# Patient Record
Sex: FEMALE | Race: WHITE
[De-identification: names, ages, dates, MRNs, and addresses within clinical notes are randomized per-mention and may not be internally consistent; named-entity substitution may affect disease eponyms.]

---

## 2019-01-19 ENCOUNTER — HOSPITAL ENCOUNTER (INPATIENT)
Dept: HOSPITAL 74 - YASAS | Age: 54
LOS: 4 days | Discharge: HOME | End: 2019-01-23
Attending: NEUROMUSCULOSKELETAL MEDICINE & OMM | Admitting: NEUROMUSCULOSKELETAL MEDICINE & OMM
Payer: COMMERCIAL

## 2019-01-19 VITALS — BODY MASS INDEX: 27.6 KG/M2

## 2019-01-19 DIAGNOSIS — E03.9: ICD-10-CM

## 2019-01-19 DIAGNOSIS — J45.22: ICD-10-CM

## 2019-01-19 DIAGNOSIS — M54.5: ICD-10-CM

## 2019-01-19 DIAGNOSIS — E66.9: ICD-10-CM

## 2019-01-19 DIAGNOSIS — Z86.010: ICD-10-CM

## 2019-01-19 DIAGNOSIS — R73.03: ICD-10-CM

## 2019-01-19 DIAGNOSIS — G89.29: ICD-10-CM

## 2019-01-19 DIAGNOSIS — F10.230: Primary | ICD-10-CM

## 2019-01-19 PROCEDURE — HZ2ZZZZ DETOXIFICATION SERVICES FOR SUBSTANCE ABUSE TREATMENT: ICD-10-PCS | Performed by: NEUROMUSCULOSKELETAL MEDICINE & OMM

## 2019-01-19 RX ADMIN — Medication SCH MG: at 22:24

## 2019-01-19 NOTE — HP
CIWA Score


Nausea/Vomitin


Muscle Tremors: 4-Moderate,w/Arms Extend


Anxiety: 2


Agitation: 1-Slight > Activity


Paroxysmal Sweats: 2


Orientation: 0-Oriented


Tacttile Disturbances: 0-None


Auditory Disturbances: 0-None


Visual Disturbances: 0-None


Headache: 2-Mild


CIWA-Ar Total Score: 13





- Admission Criteria


[facility] Guidelines: Admission for Medically Managed Detox: 


Requires at least one of the followin. CIWA greater than 12


2. Seizures within the past 24 hours


3. Delirium tremens within the past 24 hours


4. Hallucinations within the past 24 hours


5. Acute intervention needed for co  occurring medical disorder


6. Acute intervention needed for co  occurring psychiatric disorder


7. Severe withdrawal that cannot be handled at a lower level of care (continued


    vomiting, continued diarrhea, abnormal vital signs) requiring intravenous


    medication and/or fluids


8. Pregnancy





Patient presents the following: CIWA greater than 12


Admission Criteria Met: Admission criteria met





Admission ROS [facility]





- [facility]


Chief Complaint: 





alcohol detox: "Im an alcoholic and don't want to die"





54 yo with hypothyroidism, pre-diabetic, colon polyps, asthma. stopped for 

about 3 years after detox in . Restarted drinking 2018





Alcohol- drinks vodka- 1 pint-1 1/2 pints/day, no h/o seizures.  





DUR_ no controlled substances


Utox- neg, LILA- 0.429





Allergies/Adverse Reactions: 


 Allergies











Allergy/AdvReac Type Severity Reaction Status Date / Time


 


No Known Allergies Allergy   Verified 19 18:47











Exam Limitations: No Limitations





- Ebola screening


Have you traveled outside of the country in the last 21 days: No (N)


Have you had contact with anyone from an Ebola affected area: No


Have you been sick,other than usual withdrawal symptoms: No


Do you have a fever: No





- Review of Systems


Constitutional: No Symptoms Reported


EENT: reports: No Symptoms Reported


Respiratory: reports: No Symptoms reported


Cardiac: reports: No Symptoms Reported


GI: reports: No Symptoms Reported


: reports: No Symptoms Reported


Musculoskeletal: reports: No Symptoms Reported


Integumentary: reports: No Symptoms Reported


Neuro: reports: No Symptoms reported


Endocrine: reports: No Symptoms Reported


Hematology: reports: No Symptoms Reported


Psychiatric: reports: No Sypmtoms Reported


Other Systems: Reviewed and Negative





Patient History





- Patient Medical History


Hx Anemia: No


Hx Asthma: Yes (DX 2000)


Hx Chronic Obstructive Pulmonary Disease (COPD): No


Hx Cancer: No


Hx Cardiac Disorders: No


Hx Congestive Heart Failure: No


Hx Hypertension: No


Hx Hypercholesterolemia: No


Hx Pacemaker: No


HX Cerebrovascular Accident: No


Hx Seizures: No


Hx Dementia: No


Hx Diabetes: Yes (pre diabetes)


Hx Gastrointestinal Disorders: No


Hx Liver Disease: No


Hx Genitourinary Disorders: No


Hx Sexually Transmitted Disorders: No


Hx Renal Disease (ESRD): No


Hx Thyroid Disease: Yes (hypothyroidism )


Hx Human Immunodeficiency Virus (HIV): No


Hx Hepatitis C: No


Hx Depression: Yes


Hx Suicide Attempt: No


Hx Bipolar Disorder: No


Hx Schizophrenia: No


Other Medical History: hypothyroidism





- Patient Surgical History


Hx Neurologic Surgery: No


Hx Cataract Extraction: No


Hx Cardiac Surgery: No


Hx Lung Surgery: No


Hx Breast Surgery: No


Hx Breast Biopsy: No


Hx Abdominal Surgery: No


Hx Appendectomy: No


Hx Cholecystectomy: No


Hx Genitourinary Surgery: No


Hx  Section: Yes (1X 09/2007)


Hx Orthopedic Surgery: No


Anesthesia Reaction: No





- PPD History


Documented Results: Negative w/o proof


Date: 11/08/15


PPD to be Administered?: Yes





- Reproductive History


Patient is a Female of Child Bearing Age (11 -55 yrs old): Yes


Patient Pregnant: No





- Smoking Cessation


Smoking history: Former smoker


Have you smoked in the past 12 months: No


Aproximately how many cigarettes per day: 0


If you are a former smoker, when did you quit?: 


Cigars Per Day: 0


Hx Chewing Tobacco Use: No


Initiated information on smoking cessation: No





- Substances Abused


  ** Alcohol


Route: Oral


Frequency: Daily


Amount used: liquor- 1 pint


Age of first use: 8


Date of Last Use: 17





Family Disease History





- Family Disease History


Family Disease History: Heart Disease: Father (htn, alcoholic), Mother (htn), 

Respiratory: Brother (asthma , alcoholics)





Admission Physical Exam [facility]





- Vital Signs


Vital Signs: 


 Vital Signs - 24 hr











  19





  15:33


 


Temperature 97.1 F L


 


Pulse Rate 92 H


 


Respiratory 16





Rate 


 


Blood Pressure 122/87














- Physical


General Appearance: Yes: Disheveled, Intoxicated


HEENTM: Yes: Within Normal Limits, Normal ENT Inspection (hard of hearing- for 

the last 2 years- has hearing aids- but did not buy b/c of high copay), Pharynx 

Normal


Respiratory: Yes: Within Normal Limits, Lungs Clear, Other (pt states L side 

pain 10th-12th rib area- says had a fall about 3 days ago- did not seek medical 

care)


Neck: Yes: Within Normal Limits


Cardiology: Yes: Within Normal Limits, Regular Rhythm


Abdominal: Yes: Within Normal Limits


Back: Yes: Within Normal Limits, Normal Inspection


Musculoskeletal: Yes: Other (pt states gait unsteady because of peripheral 

neuropathy)


Extremities: Yes: Within Normal Limits


Neurological: Yes: Within Normal Limits (pt states she has peripheral neuropathy

-so unsteady gait)


Integumentary: Yes: Within Normal Limits


Lymphatic: Yes: Within Normal Limits





BHS Breath Alcohol Content


Breath Alcohol Content: 0.429





Urine Pregancy Test





- Result


Urine Pregnancy Test Results: Negative- NO Line Present





Urine Drug Screen





- Results


Drug Screen Negative: Yes

## 2019-01-20 LAB
ALBUMIN SERPL-MCNC: 3.9 G/DL (ref 3.4–5)
ALP SERPL-CCNC: 68 U/L (ref 45–117)
ALT SERPL-CCNC: 43 U/L (ref 13–61)
ANION GAP SERPL CALC-SCNC: 9 MMOL/L (ref 8–16)
APPEARANCE UR: (no result)
AST SERPL-CCNC: 40 U/L (ref 15–37)
BILIRUB SERPL-MCNC: 0.8 MG/DL (ref 0.2–1)
BILIRUB UR STRIP.AUTO-MCNC: NEGATIVE MG/DL
BUN SERPL-MCNC: 16 MG/DL (ref 7–18)
CALCIUM SERPL-MCNC: 8.9 MG/DL (ref 8.5–10.1)
CAOX CRY URNS QL MICRO: (no result) /HPF
CHLORIDE SERPL-SCNC: 102 MMOL/L (ref 98–107)
CO2 SERPL-SCNC: 31 MMOL/L (ref 21–32)
COLOR UR: YELLOW
CREAT SERPL-MCNC: 0.9 MG/DL (ref 0.55–1.3)
GLUCOSE SERPL-MCNC: 138 MG/DL (ref 74–106)
KETONES UR QL STRIP: NEGATIVE
LEUKOCYTE ESTERASE UR QL STRIP.AUTO: (no result)
MUCOUS THREADS URNS QL MICRO: (no result)
NITRITE UR QL STRIP: NEGATIVE
PH UR: 7 [PH] (ref 5–8)
POTASSIUM SERPLBLD-SCNC: 3.5 MMOL/L (ref 3.5–5.1)
PROT SERPL-MCNC: 7.2 G/DL (ref 6.4–8.2)
PROT UR QL STRIP: NEGATIVE
PROT UR QL STRIP: NEGATIVE
SODIUM SERPL-SCNC: 143 MMOL/L (ref 136–145)
SP GR UR: 1.02 (ref 1.01–1.03)
UROBILINOGEN UR STRIP-MCNC: (no result) MG/DL (ref 0.2–1)

## 2019-01-20 RX ADMIN — Medication SCH MG: at 22:03

## 2019-01-20 RX ADMIN — Medication PRN MG: at 22:04

## 2019-01-20 RX ADMIN — SERTRALINE HYDROCHLORIDE SCH MG: 50 TABLET ORAL at 10:29

## 2019-01-20 RX ADMIN — IBUPROFEN PRN MG: 400 TABLET, FILM COATED ORAL at 17:24

## 2019-01-20 RX ADMIN — LEVOTHYROXINE SODIUM SCH MCG: 100 TABLET ORAL at 07:03

## 2019-01-20 RX ADMIN — IBUPROFEN PRN MG: 400 TABLET, FILM COATED ORAL at 10:31

## 2019-01-20 RX ADMIN — Medication SCH TAB: at 10:29

## 2019-01-20 NOTE — CONSULT
[facility] Psychiatric Consult





- Data


Date of interview: 01/20/19


Admission source: [facility]


Identifying data: 52 y/o AA female , domiciled unemployed mother of 3 , 

depenedent on 


Substance Abuse History: Admitted to our unit for ETOH abuse, she denies balck 

out spelss or seizure disorder.  She has pior Detox admission to [facility].  

She explained that she has been sober until she relapsed last year drinking 

alcohol  she drinks Vodka daily.  Refer to drug counselor summary for more 

detailed drug info


Medical History: Chronic back pain, Asthma, pre-DM, colon polyps and 

hypothyroidism


Psychiatric History: Patient appears drowsy, sedated, fatigued, fell as sleep 

during the examination, she explained that she attends OPD clinic @ [facility] 

drug program.  She was unable to provide additional information


Physical/Sexual Abuse/Trauma History: unable to assess





Mental Status Exam





- Mental Status Exam


Cognitive Function: Impaired


Patient Appearance: Unkempt, Disheveled


Mood: Nervous


Affect: Inappropriate


Patient Behavior: Sedated, Fatigued, Distractible, Asleep, Wandering


Speech Pattern: Delayed, Slurred


Voice Loudness: Moderately Soft/Quiet


Thought Process: Disoriented


Thought Disorder: Not Present


Suicidal Ideation: Denies


Homicidal Ideation: Denies (patient does seemed high risk at this time. I am 

unable to assess the reaminder of her mental status examination )


Insight/Judgement: Poor


Sleep: Poorly


Appetite: Fair (Unable to asses the reminder of her mental status examination )





Psychiatric Findings





- Problem List (Axis 1, 2,3)


(1) Chronic low back pain


Current Visit: No   Status: Acute   





(2) Alcohol dependence with uncomplicated withdrawal


Current Visit: No   Status: Chronic   





(3) Asthma


Current Visit: No   Status: Chronic   


Qualifiers: 


   Asthma severity: mild intermittent 





(4) Hypothyroidism


Current Visit: No   Status: Chronic   


Qualifiers: 


   Hypothyroidism type: unspecified   Qualified Code(s): E03.9 - Hypothyroidism

, unspecified   





- Initial Treatment Plan


Initial Treatment Plan: Continue Detox treatment and protcol.  Monitor 

progress.  Reasses mental status examination when patient is awake and able to 

participate in a meaningful mental status examination

## 2019-01-21 LAB
BASOPHILS # BLD: 0.7 % (ref 0–2)
DEPRECATED RDW RBC AUTO: 17 % (ref 11.6–15.6)
EOSINOPHIL # BLD: 3.6 % (ref 0–4.5)
HCT VFR BLD CALC: 35.2 % (ref 32.4–45.2)
HGB BLD-MCNC: 12.3 GM/DL (ref 10.7–15.3)
LYMPHOCYTES # BLD: 42.6 % (ref 8–40)
MCH RBC QN AUTO: 33.8 PG (ref 25.7–33.7)
MCHC RBC AUTO-ENTMCNC: 34.8 G/DL (ref 32–36)
MCV RBC: 97.1 FL (ref 80–96)
MONOCYTES # BLD AUTO: 9.2 % (ref 3.8–10.2)
NEUTROPHILS # BLD: 43.9 % (ref 42.8–82.8)
PLATELET # BLD AUTO: 117 K/MM3 (ref 134–434)
PLATELET BLD QL SMEAR: (no result)
PMV BLD: 9.5 FL (ref 7.5–11.1)
RBC # BLD AUTO: 3.63 M/MM3 (ref 3.6–5.2)
WBC # BLD AUTO: 4.1 K/MM3 (ref 4–10)

## 2019-01-21 RX ADMIN — IBUPROFEN PRN MG: 400 TABLET, FILM COATED ORAL at 20:44

## 2019-01-21 RX ADMIN — SERTRALINE HYDROCHLORIDE SCH MG: 50 TABLET ORAL at 10:17

## 2019-01-21 RX ADMIN — Medication SCH TAB: at 10:17

## 2019-01-21 RX ADMIN — LEVOTHYROXINE SODIUM SCH MCG: 100 TABLET ORAL at 07:28

## 2019-01-21 RX ADMIN — Medication SCH MG: at 22:03

## 2019-01-21 RX ADMIN — Medication PRN MG: at 22:04

## 2019-01-21 NOTE — PN
S CIWA





- CIWA Score


Nausea/Vomitin-Mild Nausea/No Vomiting


Muscle Tremors: 2


Anxiety: 1-Mildly Anxious


Agitation: 1-Slight > Activity


Paroxysmal Sweats: 3


Orientation: 0-Oriented


Tacttile Disturbances: 0-None


Auditory Disturbances: 0-None


Visual Disturbances: 0-None


Headache: 0-None Present


CIWA-Ar Total Score: 8





BHS Progress Note (SOAP)


Subjective: 





sweats


slight tremors





Objective: 





19 09:31


Sleeping, but arousable to verbal stimuli


A & O x 3, lips dry





 Vital Signs











Temperature  98.1 F   19 09:20


 


Pulse Rate  71   19 09:20


 


Respiratory Rate  18   19 09:20


 


Blood Pressure  117/79   19 09:20


 


O2 Sat by Pulse Oximetry (%)      











 Laboratory Last Values











WBC  Cancelled   19  07:35    


 


Corrected WBC (auto)  Cancelled   19  07:35    


 


RBC  Cancelled   19  07:35    


 


Hgb  Cancelled   19  07:35    


 


Hct  Cancelled   19  07:35    


 


MCV  Cancelled   19  07:35    


 


MCH  Cancelled   19  07:35    


 


MCHC  Cancelled   19  07:35    


 


RDW  Cancelled   19  07:35    


 


Plt Count  Cancelled   19  07:35    


 


MPV  Cancelled   19  07:35    


 


Manual Slide Review  Cancelled   19  07:35    


 


Platelet Comment  Cancelled   19  07:35    


 


Sodium  143 mmol/L (136-145)   19  07:35    


 


Potassium  3.5 mmol/L (3.5-5.1)   19  07:35    


 


Chloride  102 mmol/L ()   19  07:35    


 


Carbon Dioxide  31 mmol/L (21-32)   19  07:35    


 


Anion Gap  9 MMOL/L (8-16)   19  07:35    


 


BUN  16 mg/dL (7-18)   19  07:35    


 


Creatinine  0.9 mg/dL (0.55-1.3)   19  07:35    


 


Creat Clearance w eGFR  > 60  (>60)   19  07:35    


 


Random Glucose  138 mg/dL ()  H  19  07:35    


 


Calcium  8.9 mg/dL (8.5-10.1)   19  07:35    


 


Total Bilirubin  0.8 mg/dL (0.2-1)   19  07:35    


 


AST  40 U/L (15-37)  H  19  07:35    


 


ALT  43 U/L (13-61)   19  07:35    


 


Alkaline Phosphatase  68 U/L ()   19  07:35    


 


Total Protein  7.2 g/dl (6.4-8.2)   19  07:35    


 


Albumin  3.9 g/dl (3.4-5.0)   19  07:35    


 


Urine Color  Yellow   19  17:34    


 


Urine Appearance  Slcloudy   19  17:34    


 


Urine pH  7.0  (5.0-8.0)   19  17:34    


 


Ur Specific Gravity  1.020  (1.010-1.035)   19  17:34    


 


Urine Protein  Negative  (NEGATIVE)   19  17:34    


 


Urine Glucose (UA)  Negative  (NEGATIVE)   19  17:34    


 


Urine Ketones  Negative  (NEGATIVE)   19  17:34    


 


Urine Blood  Negative  (NEGATIVE)   19  17:34    


 


Urine Nitrite  Negative  (NEGATIVE)   19  17:34    


 


Urine Bilirubin  Negative  (<2.0 mg/dL)   19  17:34    


 


Urine Urobilinogen  4.0 e.u/dl mg/dL (0.2-1.0)  H  19  17:34    


 


Ur Leukocyte Esterase  Trace  (NEGATIVE)   19  17:34    


 


Urine WBC (Auto)  17 /hpf (3-5)   19  17:34    


 


Urine RBC (Auto)  9 /hpf (0-3)   19  17:34    


 


Calcium Oxalate Crystal  Many /hpf (NONE SEEN)   19  17:34    


 


Urine Mucus  Rare   19  17:34    


 


RPR Titer  Nonreactive  (NONREACTIVE)   19  07:35    





Noted.pending CBC results





Assessment: 





19 09:35


withdrawal sx





Plan: 





continue detox


monitor pending lab results


increase hydration

## 2019-01-22 RX ADMIN — IBUPROFEN PRN MG: 400 TABLET, FILM COATED ORAL at 22:15

## 2019-01-22 RX ADMIN — Medication SCH TAB: at 10:09

## 2019-01-22 RX ADMIN — IBUPROFEN PRN MG: 400 TABLET, FILM COATED ORAL at 10:12

## 2019-01-22 RX ADMIN — LEVOTHYROXINE SODIUM SCH MCG: 100 TABLET ORAL at 06:03

## 2019-01-22 RX ADMIN — Medication PRN MG: at 22:15

## 2019-01-22 RX ADMIN — SERTRALINE HYDROCHLORIDE SCH MG: 50 TABLET ORAL at 10:09

## 2019-01-22 RX ADMIN — Medication SCH MG: at 22:13

## 2019-01-22 NOTE — PN
BHS Progress Note (SOAP)


Subjective: 





Body Aches.


Objective: 


PATIENT A & O X 3. IN NO ACUTE DISTRESS.





01/22/19 13:54


 Vital Signs











Temperature  98.4 F   01/22/19 13:21


 


Pulse Rate  75   01/22/19 13:21


 


Respiratory Rate  18   01/22/19 13:21


 


Blood Pressure  139/94   01/22/19 13:21


 


O2 Sat by Pulse Oximetry (%)      








 Laboratory Tests











  01/20/19 01/20/19 01/20/19





  07:35 07:35 07:35


 


WBC  Cancelled  


 


Corrected WBC (auto)  Cancelled  


 


RBC  Cancelled  


 


Hgb  Cancelled  


 


Hct  Cancelled  


 


MCV  Cancelled  


 


MCH  Cancelled  


 


MCHC  Cancelled  


 


RDW  Cancelled  


 


Plt Count  Cancelled  


 


MPV  Cancelled  


 


Absolute Neuts (auto)   


 


Neutrophils %   


 


Lymphocytes %   


 


Monocytes %   


 


Eosinophils %   


 


Basophils %   


 


Nucleated RBC %   


 


Manual Slide Review  Cancelled  


 


Platelet Estimate   


 


Platelet Comment  Cancelled  


 


Sodium   143 


 


Potassium   3.5 


 


Chloride   102 


 


Carbon Dioxide   31 


 


Anion Gap   9 


 


BUN   16 


 


Creatinine   0.9 


 


Creat Clearance w eGFR   > 60 


 


Random Glucose   138 H 


 


Calcium   8.9 


 


Total Bilirubin   0.8 


 


AST   40 H 


 


ALT   43 


 


Alkaline Phosphatase   68 


 


Total Protein   7.2 


 


Albumin   3.9 


 


Urine Color   


 


Urine Appearance   


 


Urine pH   


 


Ur Specific Gravity   


 


Urine Protein   


 


Urine Glucose (UA)   


 


Urine Ketones   


 


Urine Blood   


 


Urine Nitrite   


 


Urine Bilirubin   


 


Urine Urobilinogen   


 


Ur Leukocyte Esterase   


 


Urine WBC (Auto)   


 


Urine RBC (Auto)   


 


Calcium Oxalate Crystal   


 


Urine Mucus   


 


RPR Titer    Nonreactive














  01/20/19 01/21/19





  17:34 07:55


 


WBC   4.1


 


Corrected WBC (auto)  


 


RBC   3.63


 


Hgb   12.3


 


Hct   35.2


 


MCV   97.1 H


 


MCH   33.8 H


 


MCHC   34.8


 


RDW   17.0 H


 


Plt Count   117 L D


 


MPV   9.5


 


Absolute Neuts (auto)   1.8


 


Neutrophils %   43.9


 


Lymphocytes %   42.6 H


 


Monocytes %   9.2


 


Eosinophils %   3.6


 


Basophils %   0.7


 


Nucleated RBC %   0


 


Manual Slide Review  


 


Platelet Estimate   Slt decrease


 


Platelet Comment   


 


Sodium  


 


Potassium  


 


Chloride  


 


Carbon Dioxide  


 


Anion Gap  


 


BUN  


 


Creatinine  


 


Creat Clearance w eGFR  


 


Random Glucose  


 


Calcium  


 


Total Bilirubin  


 


AST  


 


ALT  


 


Alkaline Phosphatase  


 


Total Protein  


 


Albumin  


 


Urine Color  Yellow 


 


Urine Appearance  Slcloudy 


 


Urine pH  7.0 


 


Ur Specific Gravity  1.020 


 


Urine Protein  Negative 


 


Urine Glucose (UA)  Negative 


 


Urine Ketones  Negative 


 


Urine Blood  Negative 


 


Urine Nitrite  Negative 


 


Urine Bilirubin  Negative 


 


Urine Urobilinogen  4.0 e.u/dl H 


 


Ur Leukocyte Esterase  Trace 


 


Urine WBC (Auto)  17 


 


Urine RBC (Auto)  9 


 


Calcium Oxalate Crystal  Many 


 


Urine Mucus  Rare 


 


RPR Titer  








LABS NOTED.


Assessment: 





01/22/19 13:54


WITHDRAWAL SYMPTOMS.


THROMBOCYTOPENIA.





01/22/19 13:56





Plan: 





CONTINUE DETOX.


ENCOURAGE AMBULATION


INCREASE DAILY PO FLUID INTAKE.





PATIENT SCHEDULED FOR D/C TOMORROW.

## 2019-01-23 VITALS — TEMPERATURE: 96.6 F

## 2019-01-23 VITALS — SYSTOLIC BLOOD PRESSURE: 137 MMHG | DIASTOLIC BLOOD PRESSURE: 93 MMHG | HEART RATE: 75 BPM

## 2019-01-23 RX ADMIN — LEVOTHYROXINE SODIUM SCH MCG: 100 TABLET ORAL at 07:09

## 2019-01-23 NOTE — DS
[facility] Detox Discharge Summary


Admission Date: 


01/19/19





Discharge Date: 01/23/19





- History


Present History: Alcohol Dependence


Additional Comments: 





53 years old female admitted on 01/19/19 for alcohol withdrawal stabilization


completed detox regimen aftercare she preferred primary care provider Dr. Witt for medical mental and addiction issues


Pertinent Past History: 





patient agrees to go to community self help 12 steps





- Physical Exam Results


Vital Signs: 


 Vital Signs











Temperature  96.6 F L  01/23/19 09:18


 


Pulse Rate  75   01/23/19 09:18


 


Respiratory Rate  18   01/23/19 09:18


 


Blood Pressure  137/93   01/23/19 09:18


 


O2 Sat by Pulse Oximetry (%)      











Pertinent Admission Physical Exam Findings: 





alcohol withdrawal sx


 Laboratory Last Values











WBC  4.1 K/mm3 (4.0-10.0)   01/21/19  07:55    


 


Corrected WBC (auto)  Cancelled   01/20/19  07:35    


 


RBC  3.63 M/mm3 (3.60-5.2)   01/21/19  07:55    


 


Hgb  12.3 GM/dL (10.7-15.3)   01/21/19  07:55    


 


Hct  35.2 % (32.4-45.2)   01/21/19  07:55    


 


MCV  97.1 fl (80-96)  H  01/21/19  07:55    


 


MCH  33.8 pg (25.7-33.7)  H  01/21/19  07:55    


 


MCHC  34.8 g/dl (32.0-36.0)   01/21/19  07:55    


 


RDW  17.0 % (11.6-15.6)  H  01/21/19  07:55    


 


Plt Count  117 K/MM3 (134-434)  L D 01/21/19  07:55    


 


MPV  9.5 fl (7.5-11.1)   01/21/19  07:55    


 


Absolute Neuts (auto)  1.8 K/mm3 (1.5-8.0)   01/21/19  07:55    


 


Neutrophils %  43.9 % (42.8-82.8)   01/21/19  07:55    


 


Lymphocytes %  42.6 % (8-40)  H  01/21/19  07:55    


 


Monocytes %  9.2 % (3.8-10.2)   01/21/19  07:55    


 


Eosinophils %  3.6 % (0-4.5)   01/21/19  07:55    


 


Basophils %  0.7 % (0-2.0)   01/21/19  07:55    


 


Nucleated RBC %  0 % (0-0)   01/21/19  07:55    


 


Manual Slide Review  Cancelled   01/20/19  07:35    


 


Platelet Estimate  Slt decrease   01/21/19  07:55    


 


Platelet Comment     01/21/19  07:55    


 


Sodium  143 mmol/L (136-145)   01/20/19  07:35    


 


Potassium  3.5 mmol/L (3.5-5.1)   01/20/19  07:35    


 


Chloride  102 mmol/L ()   01/20/19  07:35    


 


Carbon Dioxide  31 mmol/L (21-32)   01/20/19  07:35    


 


Anion Gap  9 MMOL/L (8-16)   01/20/19  07:35    


 


BUN  16 mg/dL (7-18)   01/20/19  07:35    


 


Creatinine  0.9 mg/dL (0.55-1.3)   01/20/19  07:35    


 


Creat Clearance w eGFR  > 60  (>60)   01/20/19  07:35    


 


Random Glucose  138 mg/dL ()  H  01/20/19  07:35    


 


Calcium  8.9 mg/dL (8.5-10.1)   01/20/19  07:35    


 


Total Bilirubin  0.8 mg/dL (0.2-1)   01/20/19  07:35    


 


AST  40 U/L (15-37)  H  01/20/19  07:35    


 


ALT  43 U/L (13-61)   01/20/19  07:35    


 


Alkaline Phosphatase  68 U/L ()   01/20/19  07:35    


 


Total Protein  7.2 g/dl (6.4-8.2)   01/20/19  07:35    


 


Albumin  3.9 g/dl (3.4-5.0)   01/20/19  07:35    


 


Urine Color  Yellow   01/20/19  17:34    


 


Urine Appearance  Slcloudy   01/20/19  17:34    


 


Urine pH  7.0  (5.0-8.0)   01/20/19  17:34    


 


Ur Specific Gravity  1.020  (1.010-1.035)   01/20/19  17:34    


 


Urine Protein  Negative  (NEGATIVE)   01/20/19  17:34    


 


Urine Glucose (UA)  Negative  (NEGATIVE)   01/20/19  17:34    


 


Urine Ketones  Negative  (NEGATIVE)   01/20/19  17:34    


 


Urine Blood  Negative  (NEGATIVE)   01/20/19  17:34    


 


Urine Nitrite  Negative  (NEGATIVE)   01/20/19  17:34    


 


Urine Bilirubin  Negative  (<2.0 mg/dL)   01/20/19  17:34    


 


Urine Urobilinogen  4.0 e.u/dl mg/dL (0.2-1.0)  H  01/20/19  17:34    


 


Ur Leukocyte Esterase  Trace  (NEGATIVE)   01/20/19  17:34    


 


Urine WBC (Auto)  17 /hpf (3-5)   01/20/19  17:34    


 


Urine RBC (Auto)  9 /hpf (0-3)   01/20/19  17:34    


 


Calcium Oxalate Crystal  Many /hpf (NONE SEEN)   01/20/19  17:34    


 


Urine Mucus  Rare   01/20/19  17:34    


 


RPR Titer  Nonreactive  (NONREACTIVE)   01/20/19  07:35    








lab noted





- Treatment


Hospital Course: Detox Protocol Followed, Detoxed Safely, Responded well, 

Discharged Condition Good, Rehab Referral Accepted


Patient has Accepted a Rehab Referral to: primary care provider and community 

self help group





- Medication


Discharge Medications: 


Ambulatory Orders





Levothyroxine [Synthroid -] 125 mcg PO DAILY 11/06/15 


Gabapentin [Neurontin -] 750 mg PO HS 01/19/19 


Methocarbamol [Robaxin -] 600 mg PO HS 01/19/19 


Sertraline HCl [Zoloft -] 50 mg PO DAILY 01/19/19 


Albuterol Sulfate Inhaler - [Ventolin HFA Inhaler -] 0 puff IH Q6HPO PRN #1 

inhaler 01/23/19 


Budesonide/Formeterol Fumarate [SYMBICORT 160/4.5mcg -] 1 inh PO BID #1 inhaler 

01/23/19 


Levothyroxine [Synthroid -] 125 mcg PO DAILY@0700 #14 tablet 01/23/19 











- Diagnosis


(1) Alcohol dependence with uncomplicated withdrawal


Status: Acute   





(2) Asthma


Status: Chronic   


Qualifiers: 


   Asthma severity: mild   Asthma persistence: intermittent   Asthma 

complication type: with status asthmaticus   Qualified Code(s): J45.22 - Mild 

intermittent asthma with status asthmaticus   





(3) Hypothyroidism


Status: Chronic   


Qualifiers: 


   Hypothyroidism type: unspecified   Qualified Code(s): E03.9 - Hypothyroidism

, unspecified   





- AMA


Did Patient Leave Against Medical Advice: No

## 2019-02-22 ENCOUNTER — HOSPITAL ENCOUNTER (INPATIENT)
Dept: HOSPITAL 74 - YASAS | Age: 54
LOS: 4 days | Discharge: HOME | DRG: 897 | End: 2019-02-26
Attending: SURGERY | Admitting: SURGERY
Payer: COMMERCIAL

## 2019-02-22 VITALS — BODY MASS INDEX: 28.4 KG/M2

## 2019-02-22 DIAGNOSIS — F10.230: Primary | ICD-10-CM

## 2019-02-22 DIAGNOSIS — R79.89: ICD-10-CM

## 2019-02-22 DIAGNOSIS — R73.03: ICD-10-CM

## 2019-02-22 DIAGNOSIS — Z87.891: ICD-10-CM

## 2019-02-22 DIAGNOSIS — R74.0: ICD-10-CM

## 2019-02-22 DIAGNOSIS — E03.9: ICD-10-CM

## 2019-02-22 DIAGNOSIS — E66.3: ICD-10-CM

## 2019-02-22 DIAGNOSIS — F10.24: ICD-10-CM

## 2019-02-22 DIAGNOSIS — M54.5: ICD-10-CM

## 2019-02-22 DIAGNOSIS — F32.9: ICD-10-CM

## 2019-02-22 DIAGNOSIS — J45.22: ICD-10-CM

## 2019-02-22 DIAGNOSIS — G89.29: ICD-10-CM

## 2019-02-22 DIAGNOSIS — D69.6: ICD-10-CM

## 2019-02-22 PROCEDURE — HZ2ZZZZ DETOXIFICATION SERVICES FOR SUBSTANCE ABUSE TREATMENT: ICD-10-PCS | Performed by: SURGERY

## 2019-02-22 RX ADMIN — Medication SCH MG: at 22:35

## 2019-02-22 NOTE — HP
CIWA Score


Nausea/Vomitin


Muscle Tremors: 4-Moderate,w/Arms Extend


Anxiety: 4-Mod. Anxious/Guarded


Agitation: 4-Moderately Restless


Paroxysmal Sweats: 3


Orientation: 0-Oriented


Tacttile Disturbances: 0-None


Auditory Disturbances: 0-None


Visual Disturbances: 0-None


Headache: 1-Very Mild


CIWA-Ar Total Score: 18





- Admission Criteria


[facility] Guidelines: Admission for Medically Managed Detox: 


Requires at least one of the followin. CIWA greater than 12


2. Seizures within the past 24 hours


3. Delirium tremens within the past 24 hours


4. Hallucinations within the past 24 hours


5. Acute intervention needed for co  occurring medical disorder


6. Acute intervention needed for co  occurring psychiatric disorder


7. Severe withdrawal that cannot be handled at a lower level of care (continued


    vomiting, continued diarrhea, abnormal vital signs) requiring intravenous


    medication and/or fluids


8. Pregnancy








Admission ROS [facility]





- HPI


Chief Complaint: 





I need help I need to stop drinking. 


Allergies/Adverse Reactions: 


 Allergies











Allergy/AdvReac Type Severity Reaction Status Date / Time


 


No Known Allergies Allergy   Verified 19 15:45











History of Present Illness: 





pt is a 53yr old female with a history of alcohol dependence seeking detox for 

treatment. 


Exam Limitations: Intoxication





- Ebola screening


Have you traveled outside of the country in the last 21 days: No


Have you had contact with anyone from an Ebola affected area: No


Have you been sick,other than usual withdrawal symptoms: No


Do you have a fever: No





- Review of Systems


Constitutional: Diaphoresis, Loss of Appetite, Changes in sleep


EENT: reports: Hearing Loss ([place] both ears), Nose Congestion


Respiratory: reports: No Symptoms reported


Cardiac: reports: No Symptoms Reported


GI: reports: Poor Appetite, Poor Fluid Intake


: reports: No Symptoms Reported


Musculoskeletal: reports: No Symptoms Reported


Integumentary: reports: Flushing, Sweating


Neuro: reports: Tingling, Tremors


Endocrine: reports: Excessive Sweating, Flushing, Intolerance to Cold, 

Intolerance to Heat


Hematology: reports: No Symptoms Reported


Psychiatric: reports: Orientated x3, Agitated, Anxious


Other Systems: Reviewed and Negative





Patient History





- Patient Medical History


Hx Anemia: No


Hx Asthma: Yes (DX 2000)


Hx Chronic Obstructive Pulmonary Disease (COPD): No


Hx Cancer: No


Hx Cardiac Disorders: No


Hx Congestive Heart Failure: No


Hx Hypertension: No


Hx Hypercholesterolemia: No


Hx Pacemaker: No


HX Cerebrovascular Accident: No


Hx Seizures: No


Hx Dementia: No


Hx Diabetes: Yes (pre diabetes)


Hx Gastrointestinal Disorders: No


Hx Liver Disease: No


Hx Genitourinary Disorders: No


Hx Sexually Transmitted Disorders: No


Hx Renal Disease (ESRD): No


Hx Thyroid Disease: Yes (hypothyroidism )


Hx Human Immunodeficiency Virus (HIV): No


Hx Hepatitis C: No


Hx Depression: Yes


Hx Suicide Attempt: No


Hx Bipolar Disorder: No


Hx Schizophrenia: No





- Patient Surgical History


Past Surgical History: No


Hx Neurologic Surgery: No


Hx Cataract Extraction: No


Hx Cardiac Surgery: No


Hx Lung Surgery: No


Hx Breast Surgery: No


Hx Breast Biopsy: No


Hx Abdominal Surgery: No


Hx Appendectomy: No


Hx Cholecystectomy: No


Hx Genitourinary Surgery: No


Hx  Section: Yes (1X 09/2007)


Hx Orthopedic Surgery: No


Anesthesia Reaction: No





- PPD History


Previous Implant?: Yes


Documented Results: Negative w/proof


Date: 19


PPD to be Administered?: Yes





- Reproductive History


Patient is a Female of Child Bearing Age (11 -55 yrs old): No





- Smoking Cessation


Smoking history: Former smoker


Have you smoked in the past 12 months: No


Aproximately how many cigarettes per day: 0


If you are a former smoker, when did you quit?: 


Cigars Per Day: 0


Hx Chewing Tobacco Use: No


Initiated information on smoking cessation: No





- Substance & Tx. History


Hx Alcohol Use: Yes


Hx Substance Use: No


Substance Use Type: Alcohol


Hx Substance Use Treatment: Yes (last detox [facility] 2019)





- Substances Abused


  ** Alcohol


Route: Oral


Frequency: Daily


Amount used: 1 PINT VODKA


Age of first use: 9


Date of Last Use: 19





Family Disease History





- Family Disease History


Family Disease History: Heart Disease: Father (htn, alcoholic), Mother (htn), 

Respiratory: Brother (asthma , alcoholics)





Admission Physical Exam [facility]





- Vital Signs


Vital Signs: 


 Vital Signs - 24 hr











  19





  15:29


 


Temperature 96.8 F L


 


Pulse Rate 108 H


 


Respiratory 18





Rate 


 


Blood Pressure 137/77














- Physical


General Appearance: Yes: Appropriately Dressed, Moderate Distress, Obese, 

Tremorous, Irritable, Sweating, Anxious


HEENTM: Yes: Hearing grossly Normal, Normal Voice, Hearing Decreased


Respiratory: Yes: Lungs Clear, Normal Breath Sounds, No Respiratory Distress


Neck: Yes: No masses,lesions,Nodules


Breast: Yes: Within Normal Limits


Cardiology: Yes: Regular Rhythm, Regular Rate, S1, S2


Abdominal: Yes: Normal Bowel Sounds, Non Tender, Soft


Genitourinary: Yes: Within Normal Limits


Back: Yes: Normal Inspection


Musculoskeletal: Yes: full range of Motion, Back pain


Extremities: Yes: Normal Capillary Refill, Normal Inspection, Non-Tender, 

Tremors


Neurological: Yes: Fully Oriented, Alert, Normal Response


Integumentary: Yes: Diaphoresis


Lymphatic: Yes: Within Normal Limits





- Diagnostic


(1) Alcohol dependence with uncomplicated withdrawal


Current Visit: Yes   Status: Chronic   





(2) Chronic low back pain


Current Visit: Yes   Status: Chronic   


Qualifiers: 


   Back pain laterality: unspecified 





(3) Thrombocytopenia


Current Visit: No   Status: Acute   





(4) Asthma


Current Visit: Yes   Status: Chronic   


Qualifiers: 


   Asthma severity: mild   Asthma persistence: intermittent   Asthma 

complication type: with status asthmaticus   Qualified Code(s): J45.22 - Mild 

intermittent asthma with status asthmaticus   





(5) Hypothyroidism


Current Visit: Yes   Status: Chronic   


Qualifiers: 


   Hypothyroidism type: unspecified   Qualified Code(s): E03.9 - Hypothyroidism

, unspecified   





(6) Overweight (BMI 25.0-29.9)


Current Visit: Yes   Status: Chronic   





(7) Depression


Current Visit: Yes   Status: Chronic   


Qualifiers: 


   Depression Type: major depressive disorder   Major depression episode 

severity: unspecified 





Cleared for Admission [facility]





- Detox or Rehab


[facility] Level of Care: Medically Managed


Detox Regimen/Protocol: Librium





[facility] Breath Alcohol Content


Breath Alcohol Content: 0.301





Urine Pregancy Test





- Result


Urine Pregnancy Test Results: Negative- NO Line Present





Urine Drug Screen





- Results


Drug Screen Negative: No


Urine Drug Screen Results: BZO-Benzodiazepines





Inpatient Rehab Admission





- Rehab Decision to Admit


Inpatient rehab admission?: No

## 2019-02-23 LAB
ALBUMIN SERPL-MCNC: 3.8 G/DL (ref 3.4–5)
ALP SERPL-CCNC: 88 U/L (ref 45–117)
ALT SERPL-CCNC: 103 U/L (ref 13–61)
ANION GAP SERPL CALC-SCNC: 7 MMOL/L (ref 8–16)
AST SERPL-CCNC: 107 U/L (ref 15–37)
BILIRUB SERPL-MCNC: 0.4 MG/DL (ref 0.2–1)
BUN SERPL-MCNC: 12 MG/DL (ref 7–18)
CALCIUM SERPL-MCNC: 9 MG/DL (ref 8.5–10.1)
CHLORIDE SERPL-SCNC: 103 MMOL/L (ref 98–107)
CO2 SERPL-SCNC: 30 MMOL/L (ref 21–32)
CREAT SERPL-MCNC: 1 MG/DL (ref 0.55–1.3)
DEPRECATED RDW RBC AUTO: 18.3 % (ref 11.6–15.6)
GLUCOSE SERPL-MCNC: 110 MG/DL (ref 74–106)
HCT VFR BLD CALC: 37.6 % (ref 32.4–45.2)
HGB BLD-MCNC: 12.9 GM/DL (ref 10.7–15.3)
MCH RBC QN AUTO: 34 PG (ref 25.7–33.7)
MCHC RBC AUTO-ENTMCNC: 34.5 G/DL (ref 32–36)
MCV RBC: 98.7 FL (ref 80–96)
PLATELET # BLD AUTO: 155 K/MM3 (ref 134–434)
PMV BLD: 8.7 FL (ref 7.5–11.1)
POTASSIUM SERPLBLD-SCNC: 3.9 MMOL/L (ref 3.5–5.1)
PROT SERPL-MCNC: 7.1 G/DL (ref 6.4–8.2)
RBC # BLD AUTO: 3.81 M/MM3 (ref 3.6–5.2)
SODIUM SERPL-SCNC: 140 MMOL/L (ref 136–145)
WBC # BLD AUTO: 3.1 K/MM3 (ref 4–10)

## 2019-02-23 RX ADMIN — LEVOTHYROXINE SODIUM SCH MCG: 125 TABLET ORAL at 06:03

## 2019-02-23 RX ADMIN — Medication PRN MG: at 22:15

## 2019-02-23 RX ADMIN — Medication SCH MG: at 22:14

## 2019-02-23 RX ADMIN — Medication SCH TAB: at 10:36

## 2019-02-23 NOTE — PN
S CIWA





- CIWA Score


Nausea/Vomitin-No Nausea/No Vomiting


Muscle Tremors: 5


Anxiety: 4-Mod. Anxious/Guarded


Agitation: 3


Paroxysmal Sweats: 1-Minimal Palms Moist


Orientation: 0-Oriented


Tacttile Disturbances: 0-None


Auditory Disturbances: 0-None


Visual Disturbances: 0-None


Headache: 0-None Present


CIWA-Ar Total Score: 13





BHS Progress Note (SOAP)


Subjective: 





PT C/O  TREMORS, ANXIETY FATIGUE BUT REPORTS  THE MEDICATION IS HELPING HER  

AND BETTER THAN YESTERDAY BEFORE DETOXING. PT WS RESTING IN BED DURING ROUNDS, 

DECREASED APPETITE. 


Objective: 





19 15:35


 Vital Signs











  19





  08:00 10:21


 


Temperature  98.1 F


 


Pulse Rate 94 H 97 H


 


Respiratory  18





Rate  


 


Blood Pressure  112/81








 Laboratory Tests











  19





  07:40 07:40 07:40


 


WBC  3.1 L  


 


RBC  3.81  


 


Hgb  12.9  


 


Hct  37.6  


 


MCV  98.7 H  


 


MCH  34.0 H  


 


MCHC  34.5  


 


RDW  18.3 H  


 


Plt Count  155  D  


 


MPV  8.7  


 


Sodium   140 


 


Potassium   3.9 


 


Chloride   103 


 


Carbon Dioxide   30 


 


Anion Gap   7 L 


 


BUN   12 


 


Creatinine   1.0 


 


Creat Clearance w eGFR   57.56 


 


Random Glucose   110 H 


 


Calcium   9.0 


 


Total Bilirubin   0.4 


 


AST   107 H 


 


ALT   103 H 


 


Alkaline Phosphatase   88 


 


Total Protein   7.1 


 


Albumin   3.8 


 


RPR Titer    Nonreactive











Assessment: 





19 15:35


WITHDRAWAL SX


Plan: 





CONTINUE DETOX


ENCOURAGED TO INCREASE PO FLUIDS

## 2019-02-23 NOTE — CONSULT
[facility] Psychiatric Consult





- Data


Date of interview: 02/23/19


Admission source: [facility]


Identifying data: Readmission to [facility] for this 54 y/o  female self

-referred for detoxification treatment (alcohol dependence). Evaluated at [address] [place] [address]. Patient is  and living with , a mother of three, domiciled

, unemployed and supported by spouse.


Substance Abuse History: Confirmed by patient in this session. Ms Valderrama 

declares that relapse into ETOH use was triggered by a series of adverse events 

in 2018 (sudden death of a close friend, illness of her 12 y/o daughter, own 

medical hospitalization for ulcerative colitis) in 2018. Details of alcohol 

abuse patterns are described in current [facility] report as follows : Smoking history

: Former smoker.  Have you smoked in the past 12 months: No.  Aproximately how 

many cigarettes per day: 0.  If you are a former smoker, when did you quit?: 

2002.  Cigars Per Day: 0.  Hx Chewing Tobacco Use: No.  Initiated information 

on smoking cessation: No.  - Substance & Tx. History.  Hx Alcohol Use: Yes.  Hx 

Substance Use: No.  Substance Use Type: Alcohol.  Hx Substance Use Treatment: 

Yes (last detox [facility] 1/2019).  - Substances Abused.  ** Alcohol.  Route: 

Oral.  Frequency: Daily.  Amount used: 1 PINT VODKA.  Age of first use: 9.  

Date of Last Use: 02/22/19


Medical History: History of ulcerative colitis (self-report), removal of polyps 

(colon), bronchial asthma, chronic lumbar pain and diabetes mellitus (pre-

diabetes).


Psychiatric History: Patient denies history of psychiatric hospitalizations. Ms Valderrama has, however, been struggling for many years with alcohol abuse and mood 

dysregulation. Diagnosed with MDD. She is currently seeing a psychiatrist at 

the [facility]PD clinic in the [place]. Patient reports that 

since the switch to nortriptyline (from sertraline) less than a month ago, she 

has been experiencing side effects (mild, sporadic alteration of mental status 

at home). Dysphoric over the fact that she had relapsed into ETOH use after 

several months of abstinence. Patient denies history of suicide attempts.


Physical/Sexual Abuse/Trauma History: No reported history of abuse.


Additional Comment: Urine Drug Screen Results: BZO-Benzodiazepines. Noted.





Mental Status Exam





- Mental Status Exam


Alert and Oriented to: Time, Place, Person


Cognitive Function: Good


Patient Appearance: Well Groomed (overweight )


Mood: Sad, Withdrawn, Anxious


Affect: Appropriate, Mood Congruent, Constricted


Patient Behavior: Fatigued, Cooperative (well-mannered)


Speech Pattern: Clear, Appropriate


Voice Loudness: Normal


Thought Process: Intact, Goal Oriented


Thought Disorder: Not Present


Hallucinations: Denies


Suicidal Ideation: Denies


Homicidal Ideation: Denies


Insight/Judgement: Fair


Sleep: Fair (since initiation of detoxification)


Appetite: Fair


Gait/Station: Normal





Psychiatric Findings





- Problem List (Axis 1, 2,3)


(1) Alcohol dependence with uncomplicated withdrawal


Current Visit: Yes   Status: Acute   





(2) Alcohol-induced mood disorder


Current Visit: Yes   Status: Chronic   





(3) Depressive disorder


Current Visit: Yes   Status: Chronic   





- Initial Treatment Plan


Initial Treatment Plan: Psychoeducation. Support. Sleep hygiene. AA meetings. 

Medications reviewed. Nortriptyline is held at this time (caution observed in 

view of recent complaint of oversedation + confusional state at home). Patient 

is advised to report to [facility] to discuss event with her psychiatrist 

for further management. Ms [place] agrees. Observation.

## 2019-02-24 RX ADMIN — Medication SCH TAB: at 10:25

## 2019-02-24 RX ADMIN — Medication SCH MG: at 22:14

## 2019-02-24 RX ADMIN — LEVOTHYROXINE SODIUM SCH MCG: 125 TABLET ORAL at 06:01

## 2019-02-24 RX ADMIN — Medication PRN MG: at 22:14

## 2019-02-24 NOTE — PN
S CIWA





- CIWA Score


Nausea/Vomitin-Mild Nausea/No Vomiting


Muscle Tremors: 2


Anxiety: 2


Agitation: 2


Paroxysmal Sweats: 2


Orientation: 0-Oriented


Tacttile Disturbances: 0-None


Auditory Disturbances: 0-None


Visual Disturbances: 0-None


Headache: 0-None Present


CIWA-Ar Total Score: 9





BHS Progress Note (SOAP)


Subjective: 





Sweating, interrupted sleep


Objective: 





19 17:46


 Last Vital Signs











Temp Pulse Resp BP Pulse Ox


 


 97.9 F   87   18   149/60    


 


 19 17:45  19 17:45  19 17:45  19 17:45   








Elevated b/p (denies htn)





 Laboratory Tests











  19





  07:40 07:40 07:40


 


WBC  3.1 L  


 


RBC  3.81  


 


Hgb  12.9  


 


Hct  37.6  


 


MCV  98.7 H  


 


MCH  34.0 H  


 


MCHC  34.5  


 


RDW  18.3 H  


 


Plt Count  155  D  


 


MPV  8.7  


 


Sodium   140 


 


Potassium   3.9 


 


Chloride   103 


 


Carbon Dioxide   30 


 


Anion Gap   7 L 


 


BUN   12 


 


Creatinine   1.0 


 


Creat Clearance w eGFR   57.56 


 


Random Glucose   110 H 


 


Calcium   9.0 


 


Total Bilirubin   0.4 


 


AST   107 H 


 


ALT   103 H 


 


Alkaline Phosphatase   88 


 


Total Protein   7.1 


 


Albumin   3.8 


 


RPR Titer    Nonreactive








Labs reviewed: prerenal azotemia, elevated AST/ALT


Assessment: 





19 17:48


Withdrawal symptoms





Noted with elevated b/p, prerenal azotemia and elevated LFTs


Plan: 





Continue detox





Elevated blood pressure without dx of HTN: start clonidine 0.1mg PO q8hr prn, 

low sodium diet





Prerenal azotemia: encouraged PO water hydration, repeat bmp





Elevated LFTs: repeat AST/ALT

## 2019-02-25 VITALS — TEMPERATURE: 97.7 F

## 2019-02-25 LAB
ALT SERPL-CCNC: 65 U/L (ref 13–61)
ANION GAP SERPL CALC-SCNC: 9 MMOL/L (ref 8–16)
AST SERPL-CCNC: 46 U/L (ref 15–37)
BUN SERPL-MCNC: 10 MG/DL (ref 7–18)
CALCIUM SERPL-MCNC: 9.2 MG/DL (ref 8.5–10.1)
CHLORIDE SERPL-SCNC: 99 MMOL/L (ref 98–107)
CO2 SERPL-SCNC: 29 MMOL/L (ref 21–32)
CREAT SERPL-MCNC: 0.9 MG/DL (ref 0.55–1.3)
GLUCOSE SERPL-MCNC: 130 MG/DL (ref 74–106)
POTASSIUM SERPLBLD-SCNC: 3.7 MMOL/L (ref 3.5–5.1)
SODIUM SERPL-SCNC: 137 MMOL/L (ref 136–145)

## 2019-02-25 RX ADMIN — Medication SCH MG: at 22:18

## 2019-02-25 RX ADMIN — Medication PRN MG: at 22:18

## 2019-02-25 RX ADMIN — LEVOTHYROXINE SODIUM SCH MCG: 125 TABLET ORAL at 07:12

## 2019-02-25 RX ADMIN — Medication SCH TAB: at 10:42

## 2019-02-25 NOTE — PN
BHS Progress Note (SOAP)


Subjective: 





feeling much better


a bit tired. 


some sweats


Objective: 





02/25/19 10:58


 Vital Signs











Temperature  98.1 F   02/25/19 09:20


 


Pulse Rate  81   02/25/19 09:20


 


Respiratory Rate  18   02/25/19 09:20


 


Blood Pressure  129/87   02/25/19 09:20


 


O2 Sat by Pulse Oximetry (%)      








[id number]


ambulating


no acute distress


Assessment: 





02/25/19 10:58


 mild withdrawal noted








Plan: 





continue detox 


increase fluids


d/c in am

## 2019-02-26 VITALS — SYSTOLIC BLOOD PRESSURE: 125 MMHG | HEART RATE: 74 BPM | DIASTOLIC BLOOD PRESSURE: 76 MMHG

## 2019-02-26 RX ADMIN — LEVOTHYROXINE SODIUM SCH MCG: 125 TABLET ORAL at 06:34

## 2019-02-26 RX ADMIN — Medication SCH: at 09:18

## 2019-02-26 NOTE — DS
BHS Detox Discharge Summary


Admission Date: 


02/22/19





Discharge Date: 02/26/19





- History


Present History: Alcohol Dependence





- Physical Exam Results


Vital Signs: 


 Vital Signs











Temperature  97.7 F   02/26/19 07:31


 


Pulse Rate  74   02/26/19 07:31


 


Respiratory Rate  18   02/26/19 07:31


 


Blood Pressure  125/76   02/26/19 07:31


 


O2 Sat by Pulse Oximetry (%)      














- Treatment


Hospital Course: Detox Protocol Followed, Detoxed Safely, Responded well, 

Discharged Condition Good, Rehab Referral Accepted





- Medication


Discharge Medications: 


Ambulatory Orders





Methocarbamol [Robaxin -] 600 mg PO HS 01/19/19 


Levothyroxine [Synthroid -] 125 mcg PO DAILY@0700 #14 tablet 01/23/19 


Albuterol Sulfate Inhaler - [Ventolin HFA Inhaler -] 2 puff IH Q6HPO PRN 02/22/ 19 


Nortriptyline HCl [Pamelor -] 50 mg PO HS 02/22/19 











- Diagnosis


(1) Alcohol dependence with uncomplicated withdrawal


Current Visit: Yes   Status: Chronic   





(2) Chronic low back pain


Current Visit: Yes   Status: Chronic   


Qualifiers: 


   Back pain laterality: unspecified   Sciatica presence: unspecified whether 

sciatica present   Qualified Code(s): M54.5 - Low back pain; G89.29 - Other 

chronic pain   





(3) Thrombocytopenia


Current Visit: No   Status: Acute   





(4) Asthma


Current Visit: Yes   Status: Chronic   


Qualifiers: 


   Asthma severity: mild   Asthma persistence: intermittent   Asthma 

complication type: with status asthmaticus   Qualified Code(s): J45.22 - Mild 

intermittent asthma with status asthmaticus   





(5) Hypothyroidism


Current Visit: Yes   Status: Chronic   


Qualifiers: 


   Hypothyroidism type: unspecified   Qualified Code(s): E03.9 - Hypothyroidism

, unspecified   





(6) Overweight (BMI 25.0-29.9)


Current Visit: Yes   Status: Chronic   





(7) Depression


Current Visit: Yes   Status: Chronic   


Qualifiers: 


   Depression Type: major depressive disorder   Major depression episode 

severity: unspecified 





- AMA


Did Patient Leave Against Medical Advice: No (referred to outpatient rehab)

## 2019-08-19 ENCOUNTER — HOSPITAL ENCOUNTER (INPATIENT)
Dept: HOSPITAL 74 - YASAS | Age: 54
LOS: 5 days | Discharge: HOME | DRG: 897 | End: 2019-08-24
Attending: SURGERY | Admitting: SURGERY
Payer: COMMERCIAL

## 2019-08-19 VITALS — BODY MASS INDEX: 27 KG/M2
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
## 2019-08-19 DIAGNOSIS — N39.0: ICD-10-CM
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
## 2019-08-19 DIAGNOSIS — M54.5: ICD-10-CM
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
## 2019-08-19 DIAGNOSIS — J45.22: ICD-10-CM

## 2019-08-19 DIAGNOSIS — S80.02XA: ICD-10-CM

## 2019-08-19 DIAGNOSIS — F10.230: Primary | ICD-10-CM

## 2019-08-19 DIAGNOSIS — F32.9: ICD-10-CM

## 2019-08-19 DIAGNOSIS — E03.9: ICD-10-CM

## 2019-08-19 DIAGNOSIS — E66.9: ICD-10-CM
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
## 2019-08-19 LAB
ALBUMIN SERPL-MCNC: 4.4 G/DL (ref 3.4–5)
ALP SERPL-CCNC: 77 U/L (ref 45–117)
ALT SERPL-CCNC: 52 U/L (ref 13–61)
ANION GAP SERPL CALC-SCNC: 11 MMOL/L (ref 8–16)
AST SERPL-CCNC: 76 U/L (ref 15–37)
BILIRUB SERPL-MCNC: 1.7 MG/DL (ref 0.2–1)
BUN SERPL-MCNC: 15.7 MG/DL (ref 7–18)
CALCIUM SERPL-MCNC: 9.3 MG/DL (ref 8.5–10.1)
CHLORIDE SERPL-SCNC: 100 MMOL/L (ref 98–107)
CO2 SERPL-SCNC: 27 MMOL/L (ref 21–32)
CREAT SERPL-MCNC: 1.1 MG/DL (ref 0.55–1.3)
DEPRECATED RDW RBC AUTO: 17.6 % (ref 11.6–15.6)
GLUCOSE SERPL-MCNC: 144 MG/DL (ref 74–106)
HCT VFR BLD CALC: 38.1 % (ref 32.4–45.2)
HGB BLD-MCNC: 13.4 GM/DL (ref 10.7–15.3)
MCH RBC QN AUTO: 35.5 PG (ref 25.7–33.7)
MCHC RBC AUTO-ENTMCNC: 35.2 G/DL (ref 32–36)
MCV RBC: 101 FL (ref 80–96)
PLATELET # BLD AUTO: 199 K/MM3 (ref 134–434)
PMV BLD: 8.8 FL (ref 7.5–11.1)
POTASSIUM SERPLBLD-SCNC: 4 MMOL/L (ref 3.5–5.1)
PROT SERPL-MCNC: 8.2 G/DL (ref 6.4–8.2)
RBC # BLD AUTO: 3.78 M/MM3 (ref 3.6–5.2)
SODIUM SERPL-SCNC: 138 MMOL/L (ref 136–145)
WBC # BLD AUTO: 9.6 K/MM3 (ref 4–10)

## 2019-08-19 PROCEDURE — HZ2ZZZZ DETOXIFICATION SERVICES FOR SUBSTANCE ABUSE TREATMENT: ICD-10-PCS | Performed by: ALLERGY & IMMUNOLOGY

## 2019-08-19 RX ADMIN — RANITIDINE SCH MG: 150 TABLET ORAL at 22:28

## 2019-08-19 RX ADMIN — RANITIDINE SCH MG: 150 TABLET ORAL at 17:12

## 2019-08-19 RX ADMIN — Medication SCH MG: at 22:28

## 2019-08-19 RX ADMIN — Medication PRN MG: at 22:28

## 2019-08-19 NOTE — HP
CIWA Score


Nausea/Vomitin-Mild Nausea/No Vomiting


Muscle Tremors: 4-Moderate,w/Arms Extend


Anxiety: 4-Mod. Anxious/Guarded


Agitation: 4-Moderately Restless


Paroxysmal Sweats: 3


Orientation: 0-Oriented


Tacttile Disturbances: 0-None


Auditory Disturbances: 0-None


Visual Disturbances: 0-None


Headache: 1-Very Mild


CIWA-Ar Total Score: 17





- Admission Criteria


[facility]AS Guidelines: Admission for Medically Managed Detox: 


Requires at least one of the followin. CIWA greater than 12


2. Seizures within the past 24 hours


3. Delirium tremens within the past 24 hours


4. Hallucinations within the past 24 hours


5. Acute intervention needed for co  occurring medical disorder


6. Acute intervention needed for co  occurring psychiatric disorder


7. Severe withdrawal that cannot be handled at a lower level of care (continued


    vomiting, continued diarrhea, abnormal vital signs) requiring intravenous


    medication and/or fluids


8. Pregnancy








Admission ROS [facility]





- [facility]


Chief Complaint: 





I am here on my own will because I need help.


Allergies/Adverse Reactions: 


 Allergies











Allergy/AdvReac Type Severity Reaction Status Date / Time


 


No Known Allergies Allergy   Verified 19 11:12











History of Present Illness: 





pt is a 54yrold female with a history of alcohol dependence seeking detox for 

treatment. 


pt also has h/o hypothyroidism on synthroid 125mcq, h/o asthma on albuterol


pt had seen psych in the past but no longer and not taking any medication. 





Exam Limitations: No Limitations





- Ebola screening


Have you traveled outside of the country in the last 21 days: No


Have you had contact with anyone from an Ebola affected area: No


Have you been sick,other than usual withdrawal symptoms: No


Do you have a fever: No





- Review of Systems


Constitutional: Chills, Diaphoresis, Night Sweats, Changes in sleep, 

Unintentional Wgt. Loss


EENT: reports: Tearing, Nose Congestion


Respiratory: reports: No Symptoms reported


Cardiac: reports: No Symptoms Reported


GI: reports: Diarrhea, Nausea, Poor Fluid Intake


: reports: No Symptoms Reported


Musculoskeletal: reports: No Symptoms Reported


Integumentary: reports: Bruising (left knee healing bruise d/t fall), Flushing, 

Sweating


Neuro: reports: Headache, Tingling, Tremors


Endocrine: reports: Excessive Sweating, Flushing, Intolerance to Cold


Hematology: reports: No Symptoms Reported


Psychiatric: reports: No Sypmtoms Reported, Judgement Intact, Mood/Affect 

Appropiate, Orientated x3, Agitated, Anxious


Other Systems: Reviewed and Negative





Patient History





- Patient Medical History


Hx Anemia: No


Hx Asthma: Yes (DX )


Hx Chronic Obstructive Pulmonary Disease (COPD): No


Hx Cancer: No


Hx Cardiac Disorders: No


Hx Congestive Heart Failure: No


Hx Hypertension: No


Hx Hypercholesterolemia: No


Hx Pacemaker: No


HX Cerebrovascular Accident: No


Hx Seizures: No


Hx Dementia: No


Hx Diabetes: No


Hx Gastrointestinal Disorders: No


Hx Liver Disease: No


Hx Genitourinary Disorders: No


Hx Sexually Transmitted Disorders: No


Hx Renal Disease (ESRD): No


Hx Thyroid Disease: Yes (hypothyroidism )


Hx Human Immunodeficiency Virus (HIV): No


Hx Hepatitis C: No


Hx Depression: Yes (not taking any meds)


Hx Suicide Attempt: No (pt denies)


Hx Bipolar Disorder: No


Hx Schizophrenia: No





- Patient Surgical History


Past Surgical History: No


Hx Neurologic Surgery: No


Hx Cataract Extraction: No


Hx Cardiac Surgery: No


Hx Lung Surgery: No


Hx Breast Surgery: No


Hx Breast Biopsy: No


Hx Abdominal Surgery: No


Hx Appendectomy: No


Hx Cholecystectomy: No


Hx Genitourinary Surgery: No


Hx  Section: Yes (1X 09/2007)


Hx Orthopedic Surgery: No


Anesthesia Reaction: No





- PPD History


Previous Implant?: Yes


Documented Results: Negative w/proof


Date: 19


Results: 0 mm


PPD to be Administered?: No





- Reproductive History


Patient is a Female of Child Bearing Age (11 -55 yrs old): No





- Smoking Cessation


Smoking history: Former smoker


Have you smoked in the past 12 months: No


Aproximately how many cigarettes per day: 0


If you are a former smoker, when did you quit?: 


Cigars Per Day: 0


Hx Chewing Tobacco Use: No


Initiated information on smoking cessation: No


'Breaking Loose' booklet given: 19





- Substance & Tx. History


Hx Alcohol Use: Yes


Hx Substance Use: No


Substance Use Type: Alcohol


Hx Substance Use Treatment: Yes (last detox 2019)





- Substances abused


  ** Alcohol


Substance route: Oral


Frequency: Daily


Amount used: 1 pint of vodka


Age of first use: 11


Date of last use: 19





Family Disease History





- Family Disease History


Family Disease History: Heart Disease: Father (htn, alcoholic), Mother (htn), 

Respiratory: Brother (asthma , alcoholics)





Admission Physical Exam [facility]





- Vital Signs


Vital Signs: 


 Vital Signs - 24 hr











  19





  11:11


 


Temperature 96.8 F L


 


Pulse Rate 98 H


 


Respiratory 22 H





Rate 


 


Blood Pressure 180/118 H














- Physical


General Appearance: Yes: Appropriately Dressed, Moderate Distress, Obese, 

Tremorous, Irritable, Sweating, Anxious


HEENTM: Yes: Hearing grossly Normal, Normal Voice, Hearing Decreased, Nasal 

Congestion, Rhinorrhea


Respiratory: Yes: Lungs Clear, Normal Breath Sounds, No Respiratory Distress


Neck: Yes: No masses,lesions,Nodules


Breast: Yes: Within Normal Limits, No Discharge


Cardiology: Yes: Regular Rhythm, Regular Rate, S1, S2


Abdominal: Yes: Normal Bowel Sounds, Non Tender, Soft


Genitourinary: Yes: Within Normal Limits


Back: Yes: Normal Inspection


Musculoskeletal: Yes: full range of Motion, Back pain


Extremities: Yes: Normal Capillary Refill, Normal Inspection, Non-Tender, 

Tremors


Neurological: Yes: Fully Oriented, Alert, Normal Response


Integumentary: Yes: Normal Color, Diaphoresis


Lymphatic: Yes: Within Normal Limits





- Diagnostic


(1) Alcohol dependence with uncomplicated withdrawal


Current Visit: Yes   Status: Chronic   





(2) Asthma


Current Visit: Yes   Status: Chronic   


Qualifiers: 


   Asthma severity: mild   Asthma persistence: intermittent   Asthma 

complication type: with status asthmaticus   Qualified Code(s): J45.22 - Mild 

intermittent asthma with status asthmaticus   





(3) Depression


Current Visit: Yes   Status: Chronic   


Qualifiers: 


   Depression Type: major depressive disorder   Major depression recurrence: 

unspecified whether recurrent   Major depression episode severity: unspecified 





(4) Hypothyroidism


Current Visit: Yes   Status: Chronic   


Qualifiers: 


   Hypothyroidism type: unspecified   Qualified Code(s): E03.9 - Hypothyroidism

, unspecified   





(5) Overweight (BMI 25.0-29.9)


Current Visit: No   Status: Chronic   





(6) Bruising


Current Visit: Yes   Status: Acute   Comment: to left knee d/t fall   





(7) Back pain


Current Visit: Yes   Status: Chronic   


Qualifiers: 


   Back pain location: low back pain   Chronicity: chronic   Back pain 

laterality: unspecified 





Cleared for Admission [facility]





- Detox or Rehab


[facility] Level of Care: Medically Managed


Detox Regimen/Protocol: Librium





Breathalyzer





- Breathalyzer


Breathalyzer: 0





Urine Drug Screen





- Test Device


Lot number: [id number]


Expiration date: 21





- Control


Is test valid?: Yes





- Results


Drug screen NEGATIVE: Yes





Inpatient Rehab Admission





- Rehab Decision to Admit


Inpatient rehab admission?: No

## 2019-08-19 NOTE — EKG
Test Reason : 

Blood Pressure : ***/*** mmHG

Vent. Rate : 099 BPM     Atrial Rate : 099 BPM

   P-R Int : 166 ms          QRS Dur : 084 ms

    QT Int : 386 ms       P-R-T Axes : [phone] degrees

   QTc Int : 495 ms

 

NORMAL SINUS RHYTHM

PROLONGED QT

ABNORMAL ECG

NO PREVIOUS ECGS AVAILABLE

Confirmed by BAKARI MOLINA MD ([id number]) on 8/19/2019 4:28:09 PM

 

Referred By:             Confirmed By:BAKARI MOLINA MD

## 2019-08-20 LAB
APPEARANCE UR: (no result)
BACTERIA # UR AUTO: 44.2 /HPF
BILIRUB UR STRIP.AUTO-MCNC: NEGATIVE MG/DL
CASTS URNS QL MICRO: 23 /LPF (ref 0–8)
COLOR UR: (no result)
EPITH CASTS URNS QL MICRO: 2.1 /HPF
KETONES UR QL STRIP: NEGATIVE
LEUKOCYTE ESTERASE UR QL STRIP.AUTO: (no result)
NITRITE UR QL STRIP: NEGATIVE
PH UR: 6 [PH] (ref 5–8)
PROT UR QL STRIP: NEGATIVE
PROT UR QL STRIP: NEGATIVE
RBC # BLD AUTO: 5 /HPF (ref 0–4)
SP GR UR: 1.02 (ref 1.01–1.03)
UROBILINOGEN UR STRIP-MCNC: 2 MG/DL (ref 0.2–1)
WBC # UR AUTO: 152 /HPF (ref 0–5)

## 2019-08-20 RX ADMIN — RANITIDINE SCH MG: 150 TABLET ORAL at 10:29

## 2019-08-20 RX ADMIN — RANITIDINE SCH MG: 150 TABLET ORAL at 22:36

## 2019-08-20 RX ADMIN — Medication PRN MG: at 22:37

## 2019-08-20 RX ADMIN — Medication SCH MG: at 22:36

## 2019-08-20 RX ADMIN — LEVOTHYROXINE SODIUM SCH MCG: 25 TABLET ORAL at 07:23

## 2019-08-20 RX ADMIN — Medication SCH TAB: at 10:27

## 2019-08-20 NOTE — PN
S CIWA





- CIWA Score


Nausea/Vomitin-Mild Nausea/No Vomiting


Muscle Tremors: 3


Anxiety: 3


Agitation: 2


Paroxysmal Sweats: 1-Minimal Palms Moist


Orientation: 0-Oriented


Tacttile Disturbances: 0-None


Auditory Disturbances: 0-None


Visual Disturbances: 0-None


Headache: 2-Mild


CIWA-Ar Total Score: 12





[facility] Progress Note (SOAP)


Subjective: 





54 years old female admitted on 19 for acute alcohol withdrawal sx 

management


doing well with labium detox regimen


vomiting treated with tigan I'M on 19 with good effect


no vomiting today but nausea tolerate food and fluid well





bp elevation 0n 19 treated with lisinopril 10 mg + labium detox regimen 

with good effect


mild headaches today be within acceptable range


continue lisinopril 10 mg today and librium 


reduce lisinopril to 5 mg due to patient dose not have history of hypertension 





patient received 30 days naltrexon 50 mg po od on 19 to combat alcohol 

addiction


emotional support given encourage the patient seeking community support 


reporting feel tired today prefers resting on bed encourage oral fluid 





Objective: 





19 09:30


 Vital Signs











Temperature  97.4 F L  19 06:45


 


Pulse Rate  75   19 06:45


 


Respiratory Rate  18   19 06:45


 


Blood Pressure  123/82   19 06:45


 


O2 Sat by Pulse Oximetry (%)      








 Laboratory Last Values











WBC  9.6 K/mm3 (4.0-10.0)   19  14:59    


 


RBC  3.78 M/mm3 (3.60-5.2)   19  14:59    


 


Hgb  13.4 GM/dL (10.7-15.3)   19  14:59    


 


Hct  38.1 % (32.4-45.2)   19  14:59    


 


MCV  101.0 fl (80-96)  H  19  14:59    


 


MCH  35.5 pg (25.7-33.7)  H  19  14:59    


 


MCHC  35.2 g/dl (32.0-36.0)   19  14:59    


 


RDW  17.6 % (11.6-15.6)  H  19  14:59    


 


Plt Count  199 K/MM3 (134-434)  D 19  14:59    


 


MPV  8.8 fl (7.5-11.1)   19  14:59    


 


Sodium  138 mmol/L (136-145)   19  14:59    


 


Potassium  4.0 mmol/L (3.5-5.1)   19  14:59    


 


Chloride  100 mmol/L ()   19  14:59    


 


Carbon Dioxide  27 mmol/L (21-32)   19  14:59    


 


Anion Gap  11 MMOL/L (8-16)   19  14:59    


 


BUN  15.7 mg/dL (7-18)   19  14:59    


 


Creatinine  1.1 mg/dL (0.55-1.3)   19  14:59    


 


Est GFR (CKD-EPI)AfAm  65.00   19  14:59    


 


Est GFR (CKD-EPI)NonAf  56.08   19  14:59    


 


Random Glucose  144 mg/dL ()  H  19  14:59    


 


Calcium  9.3 mg/dL (8.5-10.1)   19  14:59    


 


Total Bilirubin  1.7 mg/dL (0.2-1)  H  19  14:59    


 


AST  76 U/L (15-37)  H  19  14:59    


 


ALT  52 U/L (13-61)   19  14:59    


 


Alkaline Phosphatase  77 U/L ()   19  14:59    


 


Total Protein  8.2 g/dl (6.4-8.2)   19  14:59    


 


Albumin  4.4 g/dl (3.4-5.0)   19  14:59    








lab noted 


fasting glucose 


Assessment: 





19 09:31


alcohol withdrawal sx


alert oriented x 3 


19 09:32


S1S2 REGULAR


breathing ease and even


abdomen soft non tender


Plan: 





continue librium detox regimen

## 2019-08-21 RX ADMIN — LISINOPRIL SCH MG: 5 TABLET ORAL at 10:36

## 2019-08-21 RX ADMIN — RANITIDINE SCH MG: 150 TABLET ORAL at 10:36

## 2019-08-21 RX ADMIN — Medication PRN MG: at 22:28

## 2019-08-21 RX ADMIN — LEVOTHYROXINE SODIUM SCH MCG: 25 TABLET ORAL at 06:28

## 2019-08-21 RX ADMIN — SULFAMETHOXAZOLE AND TRIMETHOPRIM SCH EACH: 800; 160 TABLET ORAL at 10:36

## 2019-08-21 RX ADMIN — Medication SCH TAB: at 10:36

## 2019-08-21 RX ADMIN — Medication SCH MG: at 22:28

## 2019-08-21 RX ADMIN — SULFAMETHOXAZOLE AND TRIMETHOPRIM SCH EACH: 800; 160 TABLET ORAL at 22:28

## 2019-08-21 RX ADMIN — RANITIDINE SCH MG: 150 TABLET ORAL at 22:28

## 2019-08-21 NOTE — PN
[facility] CIWA





- CIWA Score


Nausea/Vomitin-Mild Nausea/No Vomiting


Muscle Tremors: 3


Anxiety: 2


Agitation: 2


Paroxysmal Sweats: 2


Orientation: 0-Oriented


Tacttile Disturbances: 0-None


Auditory Disturbances: 0-None


Visual Disturbances: 0-None


Headache: 0-None Present


CIWA-Ar Total Score: 10





[facility] Progress Note (SOAP)


Subjective: 





doing well with librium detox regimen


less sweat mild tremor


prefers to be discharged on 19 due to "feeling better"


resting on bed feeling "tired" today 


patient will be assessed daily for possible 19


Objective: 





19 09:18


 Vital Signs











Temperature  96.5 F L  19 06:31


 


Pulse Rate  72   19 06:31


 


Respiratory Rate  18   19 06:31


 


Blood Pressure  110/72   19 06:31


 


O2 Sat by Pulse Oximetry (%)      








 Vital Signs





 Laboratory Last Values











WBC  9.6 K/mm3 (4.0-10.0)   19  14:59    


 


RBC  3.78 M/mm3 (3.60-5.2)   19  14:59    


 


Hgb  13.4 GM/dL (10.7-15.3)   19  14:59    


 


Hct  38.1 % (32.4-45.2)   19  14:59    


 


MCV  101.0 fl (80-96)  H  19  14:59    


 


MCH  35.5 pg (25.7-33.7)  H  19  14:59    


 


MCHC  35.2 g/dl (32.0-36.0)   19  14:59    


 


RDW  17.6 % (11.6-15.6)  H  19  14:59    


 


Plt Count  199 K/MM3 (134-434)  D 19  14:59    


 


MPV  8.8 fl (7.5-11.1)   19  14:59    


 


Sodium  138 mmol/L (136-145)   19  14:59    


 


Potassium  4.0 mmol/L (3.5-5.1)   19  14:59    


 


Chloride  100 mmol/L ()   19  14:59    


 


Carbon Dioxide  27 mmol/L (21-32)   19  14:59    


 


Anion Gap  11 MMOL/L (8-16)   19  14:59    


 


BUN  15.7 mg/dL (7-18)   19  14:59    


 


Creatinine  1.1 mg/dL (0.55-1.3)   19  14:59    


 


Est GFR (CKD-EPI)AfAm  65.00   19  14:59    


 


Est GFR (CKD-EPI)NonAf  56.08   19  14:59    


 


Random Glucose  144 mg/dL ()  H  19  14:59    


 


Calcium  9.3 mg/dL (8.5-10.1)   19  14:59    


 


Total Bilirubin  1.7 mg/dL (0.2-1)  H  19  14:59    


 


AST  76 U/L (15-37)  H  19  14:59    


 


ALT  52 U/L (13-61)   19  14:59    


 


Alkaline Phosphatase  77 U/L ()   19  14:59    


 


Total Protein  8.2 g/dl (6.4-8.2)   19  14:59    


 


Albumin  4.4 g/dl (3.4-5.0)   19  14:59    


 


Urine Color  Dk yellow   19  15:07    


 


Urine Appearance  Cloudy   19  15:07    


 


Urine pH  6.0  (5.0-8.0)   19  15:07    


 


Ur Specific Gravity  1.020  (1.010-1.035)   19  15:07    


 


Urine Protein  Negative  (NEGATIVE)   19  15:07    


 


Urine Glucose (UA)  Negative  (NEGATIVE)   19  15:07    


 


Urine Ketones  Negative  (NEGATIVE)   19  15:07    


 


Urine Blood  Negative  (NEGATIVE)   19  15:07    


 


Urine Nitrite  Negative  (NEGATIVE)   19  15:07    


 


Urine Bilirubin  Negative  (NEGATIVE)   19  15:07    


 


Urine Urobilinogen  2.0 mg/dL (0.2-1.0)  H  19  15:07    


 


Ur Leukocyte Esterase  3+  (NEGATIVE)  H  19  15:07    


 


Urine WBC (Auto)  152 /hpf (0-5)   19  15:07    


 


Urine RBC (Auto)  5 /hpf (0-4)   19  15:07    


 


Urine Casts (Auto)  23 /lpf (0-8)   19  15:07    


 


U Epithel Cells (Auto)  2.1 /HPF (0-5/HPF)   19  15:07    


 


Urine Bacteria (Auto)  44.2 /hpf (NEGATIVE)   19  15:07    


 


RPR Titer  Nonreactive  (NONREACTIVE)   19  14:59    








lab noted 


uti





19 09:20





bactrim ds bid x 5 days


increase oral fluid


Assessment: 





19 09:21


alcohol withdrawal sx


alert oriented x 3


19 09:21


speech clearly coherently


S1S2 Regular


Plan: 





continue librium detox regimen

## 2019-08-22 RX ADMIN — SIMETHICONE CHEW TAB 80 MG SCH MG: 80 TABLET ORAL at 17:09

## 2019-08-22 RX ADMIN — RANITIDINE SCH MG: 150 TABLET ORAL at 22:17

## 2019-08-22 RX ADMIN — SIMETHICONE CHEW TAB 80 MG SCH MG: 80 TABLET ORAL at 12:55

## 2019-08-22 RX ADMIN — Medication SCH MG: at 22:17

## 2019-08-22 RX ADMIN — SIMETHICONE CHEW TAB 80 MG SCH MG: 80 TABLET ORAL at 22:17

## 2019-08-22 RX ADMIN — LEVOTHYROXINE SODIUM SCH MCG: 25 TABLET ORAL at 06:47

## 2019-08-22 RX ADMIN — SULFAMETHOXAZOLE AND TRIMETHOPRIM SCH EACH: 800; 160 TABLET ORAL at 22:18

## 2019-08-22 RX ADMIN — Medication PRN MG: at 22:18

## 2019-08-22 RX ADMIN — Medication SCH TAB: at 11:07

## 2019-08-22 RX ADMIN — RANITIDINE SCH MG: 150 TABLET ORAL at 11:08

## 2019-08-22 RX ADMIN — SIMETHICONE CHEW TAB 80 MG SCH: 80 TABLET ORAL at 13:01

## 2019-08-22 RX ADMIN — SULFAMETHOXAZOLE AND TRIMETHOPRIM SCH EACH: 800; 160 TABLET ORAL at 11:08

## 2019-08-22 RX ADMIN — LISINOPRIL SCH MG: 5 TABLET ORAL at 12:55

## 2019-08-22 NOTE — PN
[facility] CIWA





- CIWA Score


Nausea/Vomitin-Mild Nausea/No Vomiting


Muscle Tremors: 2


Anxiety: 2


Agitation: 2


Paroxysmal Sweats: No Perspiration


Orientation: 0-Oriented


Tacttile Disturbances: 0-None


Auditory Disturbances: 0-None


Visual Disturbances: 0-None


Headache: 0-None Present


CIWA-Ar Total Score: 7





[facility] Progress Note (SOAP)


Subjective: 





doing well with librium detox regimen


c/o gassy " I have stomach problem for a long time"


abdomen soft non tender


bowel sound x 3 gassy


gas-x 





uncomplicated UTI


reporting "I always have so sort of urinary problem every time I go to my doctor

"
discuss oral fluid, hygiene, none allergic detergent 


bactrim ds bid 


Objective: 





19 09:59


 Vital Signs











Temperature  96.9 F L  19 09:36


 


Pulse Rate  81   19 09:36


 


Respiratory Rate  18   19 09:36


 


Blood Pressure  120/90   19 09:36


 


O2 Sat by Pulse Oximetry (%)      








 Laboratory Last Values











WBC  9.6 K/mm3 (4.0-10.0)   19  14:59    


 


RBC  3.78 M/mm3 (3.60-5.2)   19  14:59    


 


Hgb  13.4 GM/dL (10.7-15.3)   19  14:59    


 


Hct  38.1 % (32.4-45.2)   19  14:59    


 


MCV  101.0 fl (80-96)  H  19  14:59    


 


MCH  35.5 pg (25.7-33.7)  H  19  14:59    


 


MCHC  35.2 g/dl (32.0-36.0)   19  14:59    


 


RDW  17.6 % (11.6-15.6)  H  19  14:59    


 


Plt Count  199 K/MM3 (134-434)  D 19  14:59    


 


MPV  8.8 fl (7.5-11.1)   19  14:59    


 


Sodium  138 mmol/L (136-145)   19  14:59    


 


Potassium  4.0 mmol/L (3.5-5.1)   19  14:59    


 


Chloride  100 mmol/L ()   19  14:59    


 


Carbon Dioxide  27 mmol/L (21-32)   19  14:59    


 


Anion Gap  11 MMOL/L (8-16)   19  14:59    


 


BUN  15.7 mg/dL (7-18)   19  14:59    


 


Creatinine  1.1 mg/dL (0.55-1.3)   19  14:59    


 


Est GFR (CKD-EPI)AfAm  65.00   19  14:59    


 


Est GFR (CKD-EPI)NonAf  56.08   19  14:59    


 


Random Glucose  144 mg/dL ()  H  19  14:59    


 


Fasting Glucose  115 mg/dL ()  H  19  07:00    


 


Calcium  9.3 mg/dL (8.5-10.1)   19  14:59    


 


Total Bilirubin  1.7 mg/dL (0.2-1)  H  19  14:59    


 


AST  76 U/L (15-37)  H  19  14:59    


 


ALT  52 U/L (13-61)   19  14:59    


 


Alkaline Phosphatase  77 U/L ()   19  14:59    


 


Total Protein  8.2 g/dl (6.4-8.2)   19  14:59    


 


Albumin  4.4 g/dl (3.4-5.0)   19  14:59    


 


Urine Color  Dk yellow   19  15:07    


 


Urine Appearance  Cloudy   19  15:07    


 


Urine pH  6.0  (5.0-8.0)   19  15:07    


 


Ur Specific Gravity  1.020  (1.010-1.035)   19  15:07    


 


Urine Protein  Negative  (NEGATIVE)   19  15:07    


 


Urine Glucose (UA)  Negative  (NEGATIVE)   19  15:07    


 


Urine Ketones  Negative  (NEGATIVE)   19  15:07    


 


Urine Blood  Negative  (NEGATIVE)   19  15:07    


 


Urine Nitrite  Negative  (NEGATIVE)   19  15:07    


 


Urine Bilirubin  Negative  (NEGATIVE)   19  15:07    


 


Urine Urobilinogen  2.0 mg/dL (0.2-1.0)  H  19  15:07    


 


Ur Leukocyte Esterase  3+  (NEGATIVE)  H  19  15:07    


 


Urine WBC (Auto)  152 /hpf (0-5)   19  15:07    


 


Urine RBC (Auto)  5 /hpf (0-4)   19  15:07    


 


Urine Casts (Auto)  23 /lpf (0-8)   19  15:07    


 


U Epithel Cells (Auto)  2.1 /HPF (0-5/HPF)   19  15:07    


 


Urine Bacteria (Auto)  44.2 /hpf (NEGATIVE)   19  15:07    


 


RPR Titer  Nonreactive  (NONREACTIVE)   19  14:59    








lab noted


patient agrees to return to her primary care provider for rule out hyperglycemia


Assessment: 





19 10:01


alcohol withdrawal sx


patient agrees to stay till 19  takes bus to go home "I have no money 

when I passing liquid stores on the way home"


Plan: 





continue libirum detox regimen


gas - x

## 2019-08-23 RX ADMIN — RANITIDINE SCH MG: 150 TABLET ORAL at 23:02

## 2019-08-23 RX ADMIN — Medication PRN MG: at 23:03

## 2019-08-23 RX ADMIN — SIMETHICONE CHEW TAB 80 MG SCH: 80 TABLET ORAL at 13:45

## 2019-08-23 RX ADMIN — Medication SCH MG: at 23:02

## 2019-08-23 RX ADMIN — RANITIDINE SCH MG: 150 TABLET ORAL at 10:31

## 2019-08-23 RX ADMIN — LISINOPRIL SCH MG: 5 TABLET ORAL at 10:31

## 2019-08-23 RX ADMIN — LEVOTHYROXINE SODIUM SCH MCG: 25 TABLET ORAL at 06:01

## 2019-08-23 RX ADMIN — Medication SCH TAB: at 10:31

## 2019-08-23 RX ADMIN — SULFAMETHOXAZOLE AND TRIMETHOPRIM SCH EACH: 800; 160 TABLET ORAL at 10:31

## 2019-08-23 RX ADMIN — SIMETHICONE CHEW TAB 80 MG SCH MG: 80 TABLET ORAL at 23:02

## 2019-08-23 RX ADMIN — SIMETHICONE CHEW TAB 80 MG SCH MG: 80 TABLET ORAL at 10:31

## 2019-08-23 RX ADMIN — SULFAMETHOXAZOLE AND TRIMETHOPRIM SCH EACH: 800; 160 TABLET ORAL at 23:03

## 2019-08-23 RX ADMIN — SIMETHICONE CHEW TAB 80 MG SCH MG: 80 TABLET ORAL at 18:38

## 2019-08-23 NOTE — PN
[facility] CIWA





- CIWA Score


Nausea/Vomitin-No Nausea/No Vomiting


Muscle Tremors: 1-None Visible, but Felt


Anxiety: 1-Mildly Anxious


Agitation: 0-Normal Activity


Paroxysmal Sweats: No Perspiration


Orientation: 0-Oriented


Tacttile Disturbances: 0-None


Auditory Disturbances: 0-None


Visual Disturbances: 0-None


Headache: 0-None Present


CIWA-Ar Total Score: 2





[facility] Progress Note (SOAP)


Subjective: 





Patient is almost asymptomatic.  Progressed well in detox.  Wanted to leave 

today due to environment with verbal altercations on the floor.


She plans to be discharged home.  She does not wish to continue to rehab.


Objective: 





19 11:59


BP:121/68  P:84  R:18  T:97.7


 Laboratory











  19





  14:59 14:59 14:59


 


WBC  9.6 K/mm3 K/mm3    





   (4.0-10.0)   


 


RBC  3.78 M/mm3 M/mm3    





   (3.60-5.2)   


 


Hgb  13.4 GM/dL GM/dL    





   (10.7-15.3)   


 


Hct  38.1 % %    





   (32.4-45.2)   


 


MCV  101.0 fl H fl    





   (80-96)   


 


MCH  35.5 pg H pg    





   (25.7-33.7)   


 


MCHC  35.2 g/dl g/dl    





   (32.0-36.0)   


 


RDW  17.6 % H %    





   (11.6-15.6)   


 


Plt Count  199 K/MM3  D K/MM3    





   (134-434)   


 


MPV  8.8 fl fl    





   (7.5-11.1)   


 


Sodium    138 mmol/L mmol/L  





    (136-145)  


 


Potassium    4.0 mmol/L mmol/L  





    (3.5-5.1)  


 


Chloride    100 mmol/L mmol/L  





    ()  


 


Carbon Dioxide    27 mmol/L mmol/L  





    (21-32)  


 


Anion Gap    11 MMOL/L MMOL/L  





    (8-16)  


 


BUN    15.7 mg/dL mg/dL  





    (7-18)  


 


Creatinine    1.1 mg/dL mg/dL  





    (0.55-1.3)  


 


Est GFR (CKD-EPI)AfAm    65.00   





    


 


Est GFR (CKD-EPI)NonAf    56.08   





    


 


Random Glucose    144 mg/dL H mg/dL  





    ()  


 


Fasting Glucose      





    


 


Calcium    9.3 mg/dL mg/dL  





    (8.5-10.1)  


 


Total Bilirubin    1.7 mg/dL H mg/dL  





    (0.2-1)  


 


AST    76 U/L H U/L  





    (15-37)  


 


ALT    52 U/L U/L  





    (13-61)  


 


Alkaline Phosphatase    77 U/L U/L  





    ()  


 


Total Protein    8.2 g/dl g/dl  





    (6.4-8.2)  


 


Albumin    4.4 g/dl g/dl  





    (3.4-5.0)  


 


Urine Color      





    


 


Urine Appearance      





    


 


Urine pH      





    


 


Ur Specific Gravity      





    


 


Urine Protein      





    


 


Urine Glucose (UA)      





    


 


Urine Ketones      





    


 


Urine Blood      





    


 


Urine Nitrite      





    


 


Urine Bilirubin      





    


 


Urine Urobilinogen      





    


 


Ur Leukocyte Esterase      





    


 


Urine WBC (Auto)      





    


 


Urine RBC (Auto)      





    


 


Urine Casts (Auto)      





    


 


U Epithel Cells (Auto)      





    


 


Urine Bacteria (Auto)      





    


 


RPR Titer      Nonreactive 





     (NONREACTIVE) 














  19





  15:07 07:00


 


WBC    





   


 


RBC    





   


 


Hgb    





   


 


Hct    





   


 


MCV    





   


 


MCH    





   


 


MCHC    





   


 


RDW    





   


 


Plt Count    





   


 


MPV    





   


 


Sodium    





   


 


Potassium    





   


 


Chloride    





   


 


Carbon Dioxide    





   


 


Anion Gap    





   


 


BUN    





   


 


Creatinine    





   


 


Est GFR (CKD-EPI)AfAm    





   


 


Est GFR (CKD-EPI)NonAf    





   


 


Random Glucose    





   


 


Fasting Glucose    115 mg/dL H mg/dL





    () 


 


Calcium    





   


 


Total Bilirubin    





   


 


AST    





   


 


ALT    





   


 


Alkaline Phosphatase    





   


 


Total Protein    





   


 


Albumin    





   


 


Urine Color  Dk yellow   





   


 


Urine Appearance  Cloudy   





   


 


Urine pH  6.0   





   (5.0-8.0)  


 


Ur Specific Gravity  1.020   





   (1.010-1.035)  


 


Urine Protein  Negative   





   (NEGATIVE)  


 


Urine Glucose (UA)  Negative   





   (NEGATIVE)  


 


Urine Ketones  Negative   





   (NEGATIVE)  


 


Urine Blood  Negative   





   (NEGATIVE)  


 


Urine Nitrite  Negative   





   (NEGATIVE)  


 


Urine Bilirubin  Negative   





   (NEGATIVE)  


 


Urine Urobilinogen  2.0 mg/dL H mg/dL  





   (0.2-1.0)  


 


Ur Leukocyte Esterase  3+  H   





   (NEGATIVE)  


 


Urine WBC (Auto)  152 /hpf /hpf  





   (0-5)  


 


Urine RBC (Auto)  5 /hpf /hpf  





   (0-4)  


 


Urine Casts (Auto)  23 /lpf /lpf  





   (0-8)  


 


U Epithel Cells (Auto)  2.1 /HPF /HPF  





   (0-5/HPF)  


 


Urine Bacteria (Auto)  44.2 /hpf /hpf  





   (NEGATIVE)  


 


RPR Titer    





   











Assessment: 





19 12:00


1. Alcohol dependence:


2. UTI:





Plan: 





1. Will complete detox tomorrow. 


To be discharged to home.  Educated patient about continued outpatient 

treatment options. 


However, she refused.


2.  UTI on day 3:


Encouraged her to remain and not leave AMA.  


Repeat a U/A today.


Dr. Stone

## 2019-08-24 VITALS — SYSTOLIC BLOOD PRESSURE: 87 MMHG | TEMPERATURE: 97.6 F | HEART RATE: 85 BPM | DIASTOLIC BLOOD PRESSURE: 63 MMHG

## 2019-08-24 RX ADMIN — LEVOTHYROXINE SODIUM SCH MCG: 25 TABLET ORAL at 06:01

## 2019-08-24 RX ADMIN — SULFAMETHOXAZOLE AND TRIMETHOPRIM SCH EACH: 800; 160 TABLET ORAL at 09:39

## 2019-08-24 RX ADMIN — SIMETHICONE CHEW TAB 80 MG SCH MG: 80 TABLET ORAL at 09:39

## 2019-08-24 RX ADMIN — RANITIDINE SCH MG: 150 TABLET ORAL at 09:39

## 2019-08-24 RX ADMIN — LISINOPRIL SCH: 5 TABLET ORAL at 09:41

## 2019-08-24 RX ADMIN — Medication SCH TAB: at 09:39

## 2019-08-24 NOTE — DS
[facility] Detox Discharge Summary


Admission Date: 


19





Discharge Date: 19





- History


Present History: Alcohol Dependence


Additional Comments: 





PATIENT RETURNING HOME; WILL ATTEND [facility] (

[place], [place]) FOR AFTERCARE. PRESCRIPTION FOR REMAINDER OF COURSE OF 

ANTIBIOTIC (BACTRIM DS) STARTED FOR PATIENT FOR POSSIBLE UTI WHILE SHE WAS 

ADMITTED FOR DETOX SENT TO [facility] ([place], [place]) FOR AFTERCARE. 

PATIENT ADVISED TO  AND COMPLETE FULL COURSE OF REMAINDER OF 

PRESCRIPTION. PATIENT VERBALIZED UNDERSTANDING OF RECOMMENDATION. PATIENT WAS 

DISCHARGED FROM DETOX UNIT IN STABLE MEDICAL CONDITION.


Pertinent Past History: 





Asthma, Depression, Back Pain, Hypothyroidism.





- Physical Exam Results


Vital Signs: 


 Vital Signs











Temperature  97.6 F   19 09:30


 


Pulse Rate  85   19 09:30


 


Respiratory Rate  18   19 09:30


 


Blood Pressure  87/63 L  19 09:30


 


O2 Sat by Pulse Oximetry (%)      











Pertinent Admission Physical Exam Findings: 





WITHDRAWAL SYMPTOMS.





 Laboratory Tests











  19





  14:59 14:59 14:59


 


WBC  9.6  


 


RBC  3.78  


 


Hgb  13.4  


 


Hct  38.1  


 


MCV  101.0 H  


 


MCH  35.5 H  


 


MCHC  35.2  


 


RDW  17.6 H  


 


Plt Count  199  D  


 


MPV  8.8  


 


Sodium   138 


 


Potassium   4.0 


 


Chloride   100 


 


Carbon Dioxide   27 


 


Anion Gap   11 


 


BUN   15.7 


 


Creatinine   1.1 


 


Est GFR (CKD-EPI)AfAm   65.00 


 


Est GFR (CKD-EPI)NonAf   56.08 


 


Random Glucose   144 H 


 


Fasting Glucose   


 


Calcium   9.3 


 


Total Bilirubin   1.7 H 


 


AST   76 H 


 


ALT   52 


 


Alkaline Phosphatase   77 


 


Total Protein   8.2 


 


Albumin   4.4 


 


Urine Color   


 


Urine Appearance   


 


Urine pH   


 


Ur Specific Gravity   


 


Urine Protein   


 


Urine Glucose (UA)   


 


Urine Ketones   


 


Urine Blood   


 


Urine Nitrite   


 


Urine Bilirubin   


 


Urine Urobilinogen   


 


Ur Leukocyte Esterase   


 


Urine WBC (Auto)   


 


Urine RBC (Auto)   


 


Urine Casts (Auto)   


 


U Epithel Cells (Auto)   


 


Urine Bacteria (Auto)   


 


RPR Titer    Nonreactive














  19





  15:07 07:00


 


WBC  


 


RBC  


 


Hgb  


 


Hct  


 


MCV  


 


MCH  


 


MCHC  


 


RDW  


 


Plt Count  


 


MPV  


 


Sodium  


 


Potassium  


 


Chloride  


 


Carbon Dioxide  


 


Anion Gap  


 


BUN  


 


Creatinine  


 


Est GFR (CKD-EPI)AfAm  


 


Est GFR (CKD-EPI)NonAf  


 


Random Glucose  


 


Fasting Glucose   115 H


 


Calcium  


 


Total Bilirubin  


 


AST  


 


ALT  


 


Alkaline Phosphatase  


 


Total Protein  


 


Albumin  


 


Urine Color  Dk yellow 


 


Urine Appearance  Cloudy 


 


Urine pH  6.0 


 


Ur Specific Gravity  1.020 


 


Urine Protein  Negative 


 


Urine Glucose (UA)  Negative 


 


Urine Ketones  Negative 


 


Urine Blood  Negative 


 


Urine Nitrite  Negative 


 


Urine Bilirubin  Negative 


 


Urine Urobilinogen  2.0 H 


 


Ur Leukocyte Esterase  3+ H 


 


Urine WBC (Auto)  152 


 


Urine RBC (Auto)  5 


 


Urine Casts (Auto)  23 


 


U Epithel Cells (Auto)  2.1 


 


Urine Bacteria (Auto)  44.2 


 


RPR Titer  








LABS NOTED.





- Treatment


Hospital Course: Detox Protocol Followed, Detoxed Safely, Responded well, 

Discharged Condition Good


Patient has Accepted a Rehab Referral to: PATIENT WILL ATTEND [facility] ([place], [place]).





- Medication


Discharge Medications: 


Ambulatory Orders





Levothyroxine [Synthroid -] 125 mcg PO DAILY@0700 #14 tablet 19 


Albuterol Sulfate Inhaler - [Ventolin HFA Inhaler -] 2 puff IH Q6HPO PRN  


Sulfamethoxazole/Trimethoprim [Bactrim Ds -] 1 tab PO BID 5 Days #10 tablet  











- Diagnosis


(1) Bruising


Status: Acute   





(2) Alcohol dependence with uncomplicated withdrawal


Status: Acute   





(3) Asthma


Status: Chronic   


Qualifiers: 


   Asthma severity: mild   Asthma persistence: intermittent   Asthma 

complication type: with status asthmaticus   Qualified Code(s): J45.22 - Mild 

intermittent asthma with status asthmaticus   





(4) Back pain


Status: Chronic   


Qualifiers: 


   Back pain location: low back pain   Chronicity: chronic   Back pain 

laterality: unspecified   Sciatica presence: unspecified whether sciatica 

present   Qualified Code(s): M54.5 - Low back pain; G89.29 - Other chronic pain

   





(5) Depression


Status: Chronic   


Qualifiers: 


   Depression Type: major depressive disorder   Major depression recurrence: 

unspecified whether recurrent   Active/Remission status: remission status 

unspecified   Qualified Code(s): F32.9 - Major depressive disorder, single 

episode, unspecified   





(6) Hypothyroidism


Status: Chronic   


Qualifiers: 


   Hypothyroidism type: unspecified   Qualified Code(s): E03.9 - Hypothyroidism

, unspecified   





(7) Overweight (BMI 25.0-29.9)


Status: Chronic   





- AMA


Did Patient Leave Against Medical Advice: No





[facility] CIWA





- CIWA Score


Nausea/Vomitin-No Nausea/No Vomiting


Muscle Tremors: None


Anxiety: 0-No Anxiety, at Ease


Agitation: 0-Normal Activity


Paroxysmal Sweats: No Perspiration


Orientation: 0-Oriented


Tacttile Disturbances: 0-None


Auditory Disturbances: 0-None


Visual Disturbances: 0-None


Headache: 0-None Present


CIWA-Ar Total Score: 0

## 2021-05-13 ENCOUNTER — HOSPITAL ENCOUNTER (INPATIENT)
Dept: HOSPITAL 74 - YASAS | Age: 56
LOS: 4 days | Discharge: HOME | DRG: 897 | End: 2021-05-17
Attending: ALLERGY & IMMUNOLOGY | Admitting: ALLERGY & IMMUNOLOGY
Payer: COMMERCIAL

## 2021-05-13 VITALS — BODY MASS INDEX: 27.3 KG/M2

## 2021-05-13 DIAGNOSIS — L30.9: ICD-10-CM

## 2021-05-13 DIAGNOSIS — H93.19: ICD-10-CM

## 2021-05-13 DIAGNOSIS — E66.3: ICD-10-CM

## 2021-05-13 DIAGNOSIS — F10.24: Primary | ICD-10-CM

## 2021-05-13 DIAGNOSIS — F32.9: ICD-10-CM

## 2021-05-13 DIAGNOSIS — E03.9: ICD-10-CM

## 2021-05-13 DIAGNOSIS — J45.909: ICD-10-CM

## 2021-05-13 DIAGNOSIS — M54.5: ICD-10-CM

## 2021-05-13 DIAGNOSIS — I10: ICD-10-CM

## 2021-05-13 DIAGNOSIS — Z89.021: ICD-10-CM

## 2021-05-13 DIAGNOSIS — Z87.891: ICD-10-CM

## 2021-05-13 DIAGNOSIS — Z62.810: ICD-10-CM

## 2021-05-13 DIAGNOSIS — G89.29: ICD-10-CM

## 2021-05-13 LAB
ALBUMIN SERPL-MCNC: 4.5 G/DL (ref 3.4–5)
ALP SERPL-CCNC: 59 U/L (ref 45–117)
ALT SERPL-CCNC: 30 U/L (ref 13–61)
ANION GAP SERPL CALC-SCNC: 6 MMOL/L (ref 8–16)
AST SERPL-CCNC: 65 U/L (ref 15–37)
BILIRUB SERPL-MCNC: 0.9 MG/DL (ref 0.2–1)
BUN SERPL-MCNC: 17 MG/DL (ref 7–18)
CALCIUM SERPL-MCNC: 9.3 MG/DL (ref 8.5–10.1)
CHLORIDE SERPL-SCNC: 102 MMOL/L (ref 98–107)
CO2 SERPL-SCNC: 32 MMOL/L (ref 21–32)
CREAT SERPL-MCNC: 1.2 MG/DL (ref 0.55–1.3)
DEPRECATED RDW RBC AUTO: 15.5 % (ref 11.6–15.6)
GLUCOSE SERPL-MCNC: 156 MG/DL (ref 74–106)
HCT VFR BLD CALC: 38.3 % (ref 32.4–45.2)
HGB BLD-MCNC: 13.2 GM/DL (ref 10.7–15.3)
MCH RBC QN AUTO: 37.6 PG (ref 25.7–33.7)
MCHC RBC AUTO-ENTMCNC: 34.5 G/DL (ref 32–36)
MCV RBC: 108.8 FL (ref 80–96)
PLATELET # BLD AUTO: 158 K/MM3 (ref 134–434)
PMV BLD: 8.2 FL (ref 7.5–11.1)
PROT SERPL-MCNC: 8.1 G/DL (ref 6.4–8.2)
RBC # BLD AUTO: 3.53 M/MM3 (ref 3.6–5.2)
SODIUM SERPL-SCNC: 140 MMOL/L (ref 136–145)
WBC # BLD AUTO: 3.7 K/MM3 (ref 4–10)

## 2021-05-13 PROCEDURE — HZ2ZZZZ DETOXIFICATION SERVICES FOR SUBSTANCE ABUSE TREATMENT: ICD-10-PCS | Performed by: ALLERGY & IMMUNOLOGY

## 2021-05-13 PROCEDURE — U0003 INFECTIOUS AGENT DETECTION BY NUCLEIC ACID (DNA OR RNA); SEVERE ACUTE RESPIRATORY SYNDROME CORONAVIRUS 2 (SARS-COV-2) (CORONAVIRUS DISEASE [COVID-19]), AMPLIFIED PROBE TECHNIQUE, MAKING USE OF HIGH THROUGHPUT TECHNOLOGIES AS DESCRIBED BY CMS-2020-01-R: HCPCS

## 2021-05-13 PROCEDURE — U0005 INFEC AGEN DETEC AMPLI PROBE: HCPCS

## 2021-05-13 PROCEDURE — C9803 HOPD COVID-19 SPEC COLLECT: HCPCS

## 2021-05-13 RX ADMIN — HYDROXYZINE PAMOATE SCH MG: 25 CAPSULE ORAL at 22:29

## 2021-05-13 RX ADMIN — HYDROXYZINE PAMOATE SCH MG: 25 CAPSULE ORAL at 15:39

## 2021-05-13 RX ADMIN — Medication SCH MG: at 22:29

## 2021-05-13 RX ADMIN — Medication SCH TAB: at 15:39

## 2021-05-13 RX ADMIN — HYDROXYZINE PAMOATE SCH MG: 25 CAPSULE ORAL at 17:55

## 2021-05-14 RX ADMIN — Medication SCH TAB: at 10:34

## 2021-05-14 RX ADMIN — HYDROXYZINE PAMOATE SCH MG: 25 CAPSULE ORAL at 10:36

## 2021-05-14 RX ADMIN — HYDROXYZINE PAMOATE SCH MG: 25 CAPSULE ORAL at 06:38

## 2021-05-14 RX ADMIN — Medication SCH MG: at 22:08

## 2021-05-14 RX ADMIN — SERTRALINE HYDROCHLORIDE SCH MG: 50 TABLET ORAL at 10:36

## 2021-05-14 RX ADMIN — HYDROXYZINE PAMOATE SCH MG: 25 CAPSULE ORAL at 13:23

## 2021-05-14 RX ADMIN — LEVOTHYROXINE SODIUM SCH MCG: 100 TABLET ORAL at 06:39

## 2021-05-14 RX ADMIN — HYDROXYZINE PAMOATE SCH MG: 25 CAPSULE ORAL at 18:03

## 2021-05-14 RX ADMIN — HYDROXYZINE PAMOATE SCH MG: 25 CAPSULE ORAL at 22:11

## 2021-05-14 RX ADMIN — SERTRALINE HYDROCHLORIDE SCH MG: 50 TABLET ORAL at 22:05

## 2021-05-15 RX ADMIN — HYDROXYZINE PAMOATE SCH MG: 25 CAPSULE ORAL at 17:23

## 2021-05-15 RX ADMIN — LEVOTHYROXINE SODIUM SCH MCG: 100 TABLET ORAL at 06:49

## 2021-05-15 RX ADMIN — SERTRALINE HYDROCHLORIDE SCH MG: 50 TABLET ORAL at 10:25

## 2021-05-15 RX ADMIN — Medication SCH MG: at 22:06

## 2021-05-15 RX ADMIN — HYDROXYZINE PAMOATE SCH MG: 25 CAPSULE ORAL at 22:06

## 2021-05-15 RX ADMIN — SERTRALINE HYDROCHLORIDE SCH MG: 50 TABLET ORAL at 22:06

## 2021-05-15 RX ADMIN — Medication SCH TAB: at 10:25

## 2021-05-15 RX ADMIN — HYDROXYZINE PAMOATE SCH MG: 25 CAPSULE ORAL at 10:25

## 2021-05-15 RX ADMIN — HYDROXYZINE PAMOATE SCH MG: 25 CAPSULE ORAL at 06:18

## 2021-05-15 RX ADMIN — HYDROXYZINE PAMOATE SCH MG: 25 CAPSULE ORAL at 13:50

## 2021-05-16 VITALS — TEMPERATURE: 97.1 F

## 2021-05-16 RX ADMIN — Medication SCH MG: at 22:01

## 2021-05-16 RX ADMIN — SERTRALINE HYDROCHLORIDE SCH MG: 50 TABLET ORAL at 10:54

## 2021-05-16 RX ADMIN — Medication SCH TAB: at 10:53

## 2021-05-16 RX ADMIN — HYDROXYZINE PAMOATE SCH: 25 CAPSULE ORAL at 07:01

## 2021-05-16 RX ADMIN — HYDROXYZINE PAMOATE SCH MG: 25 CAPSULE ORAL at 22:01

## 2021-05-16 RX ADMIN — HYDROXYZINE PAMOATE SCH MG: 25 CAPSULE ORAL at 17:29

## 2021-05-16 RX ADMIN — HYDROXYZINE PAMOATE SCH: 25 CAPSULE ORAL at 14:58

## 2021-05-16 RX ADMIN — LEVOTHYROXINE SODIUM SCH MCG: 100 TABLET ORAL at 08:06

## 2021-05-16 RX ADMIN — SERTRALINE HYDROCHLORIDE SCH MG: 50 TABLET ORAL at 22:01

## 2021-05-16 RX ADMIN — HYDROXYZINE PAMOATE SCH MG: 25 CAPSULE ORAL at 10:53

## 2021-05-17 VITALS — DIASTOLIC BLOOD PRESSURE: 89 MMHG | SYSTOLIC BLOOD PRESSURE: 117 MMHG | HEART RATE: 82 BPM

## 2021-05-17 RX ADMIN — HYDROXYZINE PAMOATE SCH MG: 25 CAPSULE ORAL at 05:51

## 2021-05-17 RX ADMIN — SERTRALINE HYDROCHLORIDE SCH MG: 50 TABLET ORAL at 09:19

## 2021-05-17 RX ADMIN — HYDROXYZINE PAMOATE SCH MG: 25 CAPSULE ORAL at 09:19

## 2021-05-17 RX ADMIN — Medication SCH TAB: at 09:19

## 2021-05-17 RX ADMIN — LEVOTHYROXINE SODIUM SCH MCG: 100 TABLET ORAL at 06:50

## 2022-07-04 ENCOUNTER — APPOINTMENT (EMERGENCY)
Dept: RADIOLOGY | Facility: HOSPITAL | Age: 57
End: 2022-07-04
Payer: COMMERCIAL

## 2022-07-04 ENCOUNTER — HOSPITAL ENCOUNTER (EMERGENCY)
Facility: HOSPITAL | Age: 57
Discharge: OTHER INSTITUTION | End: 2022-07-04
Attending: EMERGENCY MEDICINE | Admitting: EMERGENCY MEDICINE
Payer: COMMERCIAL

## 2022-07-04 VITALS
TEMPERATURE: 99.1 F | BODY MASS INDEX: 27.62 KG/M2 | RESPIRATION RATE: 18 BRPM | OXYGEN SATURATION: 94 % | HEIGHT: 67 IN | HEART RATE: 92 BPM | SYSTOLIC BLOOD PRESSURE: 95 MMHG | DIASTOLIC BLOOD PRESSURE: 60 MMHG | WEIGHT: 176 LBS

## 2022-07-04 DIAGNOSIS — R50.9 FEVER: ICD-10-CM

## 2022-07-04 DIAGNOSIS — N39.0 UTI (URINARY TRACT INFECTION): Primary | ICD-10-CM

## 2022-07-04 LAB
BACTERIA UR QL AUTO: ABNORMAL /HPF
BILIRUB UR QL STRIP: NEGATIVE
CLARITY UR: ABNORMAL
COLOR UR: YELLOW
GLUCOSE UR STRIP-MCNC: NEGATIVE MG/DL
HGB UR QL STRIP.AUTO: ABNORMAL
KETONES UR STRIP-MCNC: NEGATIVE MG/DL
LEUKOCYTE ESTERASE UR QL STRIP: ABNORMAL
NITRITE UR QL STRIP: POSITIVE
NON-SQ EPI CELLS URNS QL MICRO: ABNORMAL /HPF
PH UR STRIP.AUTO: 6 [PH]
PROT UR STRIP-MCNC: ABNORMAL MG/DL
RBC #/AREA URNS AUTO: ABNORMAL /HPF
SP GR UR STRIP.AUTO: <=1.005 (ref 1–1.03)
UROBILINOGEN UR QL STRIP.AUTO: 0.2 E.U./DL
WBC #/AREA URNS AUTO: ABNORMAL /HPF

## 2022-07-04 PROCEDURE — 87186 SC STD MICRODIL/AGAR DIL: CPT | Performed by: EMERGENCY MEDICINE

## 2022-07-04 PROCEDURE — 87077 CULTURE AEROBIC IDENTIFY: CPT | Performed by: EMERGENCY MEDICINE

## 2022-07-04 PROCEDURE — 99284 EMERGENCY DEPT VISIT MOD MDM: CPT

## 2022-07-04 PROCEDURE — 71046 X-RAY EXAM CHEST 2 VIEWS: CPT

## 2022-07-04 PROCEDURE — 99284 EMERGENCY DEPT VISIT MOD MDM: CPT | Performed by: EMERGENCY MEDICINE

## 2022-07-04 PROCEDURE — 87086 URINE CULTURE/COLONY COUNT: CPT | Performed by: EMERGENCY MEDICINE

## 2022-07-04 PROCEDURE — 81001 URINALYSIS AUTO W/SCOPE: CPT | Performed by: EMERGENCY MEDICINE

## 2022-07-04 RX ORDER — CEPHALEXIN 500 MG/1
500 CAPSULE ORAL EVERY 6 HOURS SCHEDULED
Qty: 28 CAPSULE | Refills: 0 | Status: SHIPPED | OUTPATIENT
Start: 2022-07-04 | End: 2022-07-11

## 2022-07-04 RX ORDER — CEPHALEXIN 250 MG/1
500 CAPSULE ORAL ONCE
Status: COMPLETED | OUTPATIENT
Start: 2022-07-04 | End: 2022-07-04

## 2022-07-04 RX ADMIN — CEPHALEXIN 500 MG: 250 CAPSULE ORAL at 11:33

## 2022-07-04 NOTE — ED PROVIDER NOTES
History  Chief Complaint   Patient presents with    Flu Symptoms     Patient co fever, cough, congestion, body aches that started 1 week ago  Patient coming from [facility]  60 yo female who presents to ED c/o 3 days of fever, chills and cough  Associated myalgias, headache and dysuria  Took a negative covid test prior to arrival  Took a zpak last week for URI sxs  + wheezing but no dyspnea, notes PMH significant for asthma  No CP  No abd pain  No back pain  No rash  No neck pain or stiffness or photophobia  None       Past Medical History:   Diagnosis Date    Asthma        Past Surgical History:   Procedure Laterality Date     SECTION      SINUS SURGERY         History reviewed  No pertinent family history  I have reviewed and agree with the history as documented  E-Cigarette/Vaping    E-Cigarette Use Never User      E-Cigarette/Vaping Substances     Social History     Tobacco Use    Smoking status: Former Smoker    Smokeless tobacco: Never Used   Vaping Use    Vaping Use: Never used   Substance Use Topics    Alcohol use: Not Currently     Comment: in rehab    Drug use: Not Currently       Review of Systems   Constitutional: Positive for chills and fever  Eyes: Negative for photophobia and visual disturbance  Respiratory: Positive for cough  Negative for shortness of breath  Gastrointestinal: Negative for abdominal pain, nausea and vomiting  Genitourinary: Positive for dysuria  Negative for difficulty urinating and flank pain  Musculoskeletal: Positive for myalgias  Negative for neck pain and neck stiffness  Neurological: Positive for headaches  Negative for dizziness, tremors, seizures, syncope, facial asymmetry, speech difficulty, weakness, light-headedness and numbness  All other systems reviewed and are negative  Physical Exam  Physical Exam  Vitals and nursing note reviewed  Constitutional:       General: She is not in acute distress  Appearance: Normal appearance  She is well-developed  She is not ill-appearing, toxic-appearing or diaphoretic  HENT:      Head: Normocephalic and atraumatic  Eyes:      Conjunctiva/sclera: Conjunctivae normal       Pupils: Pupils are equal, round, and reactive to light  Neck:      Vascular: No JVD  Cardiovascular:      Rate and Rhythm: Normal rate and regular rhythm  Heart sounds: Normal heart sounds  Pulmonary:      Effort: Pulmonary effort is normal  No respiratory distress  Breath sounds: No stridor  Wheezing present  No rhonchi or rales  Chest:      Chest wall: No tenderness  Abdominal:      General: There is no distension  Palpations: Abdomen is soft  Tenderness: There is no abdominal tenderness  There is no guarding or rebound  Musculoskeletal:         General: No swelling, tenderness or deformity  Normal range of motion  Cervical back: Normal range of motion and neck supple  No rigidity  Right lower leg: No edema  Left lower leg: No edema  Skin:     General: Skin is warm and dry  Capillary Refill: Capillary refill takes less than 2 seconds  Coloration: Skin is not jaundiced or pale  Findings: No bruising, erythema, lesion or rash  Neurological:      General: No focal deficit present  Mental Status: She is alert and oriented to person, place, and time  Cranial Nerves: No cranial nerve deficit  Sensory: No sensory deficit  Motor: No weakness or abnormal muscle tone        Coordination: Coordination normal          Vital Signs  ED Triage Vitals [07/04/22 0929]   Temperature Pulse Respirations Blood Pressure SpO2   99 °F (37 2 °C) 90 17 95/60 95 %      Temp Source Heart Rate Source Patient Position - Orthostatic VS BP Location FiO2 (%)   Oral Monitor Lying Right arm --      Pain Score       --           Vitals:    07/04/22 0929   BP: 95/60   Pulse: 90   Patient Position - Orthostatic VS: Lying         Visual Acuity      ED Medications  Medications   cephalexin (KEFLEX) capsule 500 mg (500 mg Oral Given 7/4/22 1133)       Diagnostic Studies  Results Reviewed     Procedure Component Value Units Date/Time    Urine Microscopic [[id number]]  (Abnormal) Collected: 07/04/22 1115    Lab Status: Final result Specimen: Urine, Clean Catch Updated: 07/04/22 1142     RBC, UA 4-10 /hpf      WBC, UA 30-50 /hpf      Epithelial Cells Occasional /hpf      Bacteria, UA Innumerable /hpf     Urine culture [[id number]]     Lab Status: No result Specimen: Urine, Clean Catch     UA (URINE) with reflex to Scope [[id number]]  (Abnormal) Collected: 07/04/22 1115    Lab Status: Final result Specimen: Urine, Clean Catch Updated: 07/04/22 1129     Color, UA Yellow     Clarity, UA Slightly Cloudy     Specific Gravity, UA <=1 005     pH, UA 6 0     Leukocytes, UA Large     Nitrite, UA Positive     Protein, UA Trace mg/dl      Glucose, UA Negative mg/dl      Ketones, UA Negative mg/dl      Urobilinogen, UA 0 2 E U /dl      Bilirubin, UA Negative     Occult Blood, UA Moderate                 XR chest 2 views   ED Interpretation by Danyell Trotter MD (07/04 1001)   No acute abnormality  Procedures  Procedures         ED Course                               SBIRT 22yo+    Flowsheet Row Most Recent Value   SBIRT (23 yo +)    In order to provide better care to our patients, we are screening all of our patients for alcohol and drug use  Would it be okay to ask you these screening questions?  No Filed at: 07/04/2022 1140                    MDM    Disposition  Final diagnoses:   UTI (urinary tract infection)   Fever     Time reflects when diagnosis was documented in both MDM as applicable and the Disposition within this note     Time User Action Codes Description Comment    7/4/2022 11:30 AM Michelle Yoon [N39 [id number]] UTI (urinary tract infection)     7/4/2022 12:12 PM Cruz Yoon [R50 [id number]] Fever       ED Disposition     ED Disposition   Discharge    Condition Stable    Date/Time   Mon Jul 4, 2022 11:30 AM    Comment   Carito Light discharge to home/self care  Follow-up Information     Follow up With Specialties Details Why Contact Info Additional Information    [address] [facility] Emergency Department Emergency Medicine  If symptoms worsen [address] Emergency Department, [address] [place] [address], [id number]          Patient's Medications   Discharge Prescriptions    CEPHALEXIN (KEFLEX) 500 MG CAPSULE    Take 1 capsule (500 mg total) by mouth every 6 (six) hours for 7 days       Start Date: 7/4/2022  End Date: 7/11/2022       Order Dose: 500 mg       Quantity: 28 capsule    Refills: 0       No discharge procedures on file      PDMP Review     None          ED Provider  Electronically Signed by           Adrienne Her MD  07/04/22 9585

## 2022-07-04 NOTE — ED NOTES
bita reports en route to pick pt up with short ETA  Requesting re fax of paperwork  Faxed again  Pt provided with d/c paperwork and results for staff at [facility]  Pt in waiting room awaiting сергей Stover RN  07/04/22 9958

## 2022-07-04 NOTE — ED NOTES
Spoke with [facility] from [facility] , will arrange for transport back to facility and call with ETA  Requesting results and d/c paper to be faxed   Faxed at [address], [facility]  07/04/22 1207

## 2022-07-06 ENCOUNTER — HOSPITAL ENCOUNTER (EMERGENCY)
Facility: HOSPITAL | Age: 57
Discharge: HOME | End: 2022-07-06
Attending: EMERGENCY MEDICINE
Payer: COMMERCIAL

## 2022-07-06 VITALS
HEART RATE: 77 BPM | RESPIRATION RATE: 17 BRPM | SYSTOLIC BLOOD PRESSURE: 107 MMHG | DIASTOLIC BLOOD PRESSURE: 68 MMHG | OXYGEN SATURATION: 99 % | TEMPERATURE: 97.7 F

## 2022-07-06 DIAGNOSIS — N39.0 UTI (URINARY TRACT INFECTION): Primary | ICD-10-CM

## 2022-07-06 LAB
BACTERIA UR CULT: ABNORMAL
BACTERIA UR QL AUTO: ABNORMAL /HPF
BILIRUB UR QL STRIP: NEGATIVE
CLARITY UR: CLEAR
COLOR UR: YELLOW
GLUCOSE UR STRIP-MCNC: NEGATIVE MG/DL
HGB UR QL STRIP.AUTO: NEGATIVE
KETONES UR STRIP-MCNC: NEGATIVE MG/DL
LEUKOCYTE ESTERASE UR QL STRIP: ABNORMAL
NITRITE UR QL STRIP: NEGATIVE
NON-SQ EPI CELLS URNS QL MICRO: ABNORMAL /HPF
PH UR STRIP.AUTO: 6 [PH]
PROT UR STRIP-MCNC: ABNORMAL MG/DL
RBC #/AREA URNS AUTO: ABNORMAL /HPF
SP GR UR STRIP.AUTO: 1.01 (ref 1–1.03)
UROBILINOGEN UR QL STRIP.AUTO: 2 E.U./DL
WBC #/AREA URNS AUTO: ABNORMAL /HPF

## 2022-07-06 PROCEDURE — 99283 EMERGENCY DEPT VISIT LOW MDM: CPT

## 2022-07-06 PROCEDURE — 87086 URINE CULTURE/COLONY COUNT: CPT | Performed by: EMERGENCY MEDICINE

## 2022-07-06 PROCEDURE — 99284 EMERGENCY DEPT VISIT MOD MDM: CPT

## 2022-07-06 PROCEDURE — 81001 URINALYSIS AUTO W/SCOPE: CPT | Performed by: EMERGENCY MEDICINE

## 2022-07-06 RX ORDER — ACETAMINOPHEN 325 MG/1
975 TABLET ORAL EVERY 8 HOURS PRN
Refills: 0
Start: 2022-07-06

## 2022-07-06 NOTE — ED NOTES
H[facility] en route to  pt and return to [place]  Pt in ER waiting area       Shlomo Lewis RN  07/06/22 5623

## 2022-07-06 NOTE — ED PROVIDER NOTES
History  Chief Complaint   Patient presents with    Painful Urination     Burning with urination, increased freq and fever since   Lower abd pain and pressure and headache     33-year-old female with past medical history of asthma, hypothyroidism, alcohol abuse currently in rehab and recent UTI presents with persistent dysuria intermittent right-sided low back and fevers with T-max of 103 2° which break with p r n  Tylenol  Patient was seen in the emergency department , UA was positive and patient was started on a 7 day course of Keflex  Repeat UA performed today shows improvement, with decreasing leukocytes and nitrates negative  History provided by:  Patient   used: No        Prior to Admission Medications   Prescriptions Last Dose Informant Patient Reported? Taking? cephalexin (KEFLEX) 500 mg capsule   No No   Sig: Take 1 capsule (500 mg total) by mouth every 6 (six) hours for 7 days      Facility-Administered Medications: None       Past Medical History:   Diagnosis Date    Asthma        Past Surgical History:   Procedure Laterality Date     SECTION      SINUS SURGERY         History reviewed  No pertinent family history  I have reviewed and agree with the history as documented  E-Cigarette/Vaping    E-Cigarette Use Never User      E-Cigarette/Vaping Substances     Social History     Tobacco Use    Smoking status: Former Smoker    Smokeless tobacco: Never Used   Vaping Use    Vaping Use: Never used   Substance Use Topics    Alcohol use: Not Currently     Comment: in rehab    Drug use: Not Currently       Review of Systems   Constitutional: Positive for chills and fever  Respiratory: Negative for chest tightness and shortness of breath  Cardiovascular: Negative for chest pain  Gastrointestinal: Positive for nausea  Negative for vomiting  Genitourinary: Positive for dysuria and flank pain  Musculoskeletal: Positive for back pain     Neurological: Negative for light-headedness and headaches  Psychiatric/Behavioral: Negative for confusion  Physical Exam  Physical Exam  Constitutional:       General: She is awake  She is not in acute distress  HENT:      Head: Normocephalic and atraumatic  Eyes:      General: Lids are normal    Cardiovascular:      Rate and Rhythm: Normal rate and regular rhythm  Pulmonary:      Effort: Pulmonary effort is normal       Breath sounds: Normal breath sounds  Abdominal:      General: There is no distension  Palpations: Abdomen is soft  Tenderness: There is abdominal tenderness in the suprapubic area  Musculoskeletal:      Cervical back: Full passive range of motion without pain  Skin:     General: Skin is warm and dry  Capillary Refill: Capillary refill takes less than 2 seconds  Neurological:      Mental Status: She is alert and oriented to person, place, and time  GCS: GCS eye subscore is 4  GCS verbal subscore is 5  GCS motor subscore is 6  Psychiatric:         Behavior: Behavior is cooperative           Vital Signs  ED Triage Vitals [07/06/22 1029]   Temperature Pulse Respirations Blood Pressure SpO2   97 7 °F (36 5 °C) 77 17 107/68 99 %      Temp Source Heart Rate Source Patient Position - Orthostatic VS BP Location FiO2 (%)   Oral Monitor -- Right arm --      Pain Score       5           Vitals:    07/06/22 1029   BP: 107/68   Pulse: 77         Visual Acuity      ED Medications  Medications - No data to display    Diagnostic Studies  Results Reviewed     Procedure Component Value Units Date/Time    UA w Reflex to Microscopic w Reflex to Culture [[id number]]  (Abnormal) Collected: 07/06/22 1040    Lab Status: Final result Specimen: Urine, Clean Catch Updated: 07/06/22 1055     Color, UA Yellow     Clarity, UA Clear     Specific Gravity, UA 1 015     pH, UA 6 0     Leukocytes, UA Trace     Nitrite, UA Negative     Protein, UA 30 (1+) mg/dl      Glucose, UA Negative mg/dl      Ketones, UA Negative mg/dl      Urobilinogen, UA 2 0 E U /dl      Bilirubin, UA Negative     Occult Blood, UA Negative    Urine Microscopic [[id number]] Collected: 07/06/22 1040    Lab Status: In process Specimen: Urine, Clean Catch Updated: 07/06/22 1055                 No orders to display              Procedures  Procedures         ED Course                                             MDM  Number of Diagnoses or Management Options  UTI (urinary tract infection): established and improving  Diagnosis management comments: Patient with improving UTI on repeat UA during this ED visit  Will continue current course of antibiotics and await culture results  If culture demonstrates resistance to complex then will change antibiotics to Bactrim  Amount and/or Complexity of Data Reviewed  Clinical lab tests: reviewed        Disposition  Final diagnoses:   None     ED Disposition     None      Follow-up Information    None         Patient's Medications   Discharge Prescriptions    No medications on file       No discharge procedures on file      PDMP Review     None          ED Provider  Electronically Signed by  Maebelle Edu PA-C Leopoldo Corrigan  07/06/22 0207

## 2022-07-06 NOTE — DISCHARGE INSTRUCTIONS
You may take tylenol as needed for fever relief  Continue antibiotics as previously prescribed  Please return to the ED if there is any severe flank pain, persistent lightheadedness/dizziness  We will call and change your antibiotics if the urine culture shows any resistance to the antibiotics you are currently on

## 2022-07-07 LAB — BACTERIA UR CULT: NORMAL
